# Patient Record
Sex: MALE | Race: ASIAN | NOT HISPANIC OR LATINO | URBAN - METROPOLITAN AREA
[De-identification: names, ages, dates, MRNs, and addresses within clinical notes are randomized per-mention and may not be internally consistent; named-entity substitution may affect disease eponyms.]

---

## 2017-04-02 ENCOUNTER — EMERGENCY (EMERGENCY)
Facility: HOSPITAL | Age: 32
LOS: 1 days | Discharge: OTHER INSTITUTION | End: 2017-04-02
Attending: EMERGENCY MEDICINE
Payer: MEDICAID

## 2017-04-02 VITALS
HEART RATE: 75 BPM | DIASTOLIC BLOOD PRESSURE: 80 MMHG | RESPIRATION RATE: 16 BRPM | WEIGHT: 190.04 LBS | TEMPERATURE: 99 F | SYSTOLIC BLOOD PRESSURE: 126 MMHG | HEIGHT: 74 IN | OXYGEN SATURATION: 97 %

## 2017-04-02 DIAGNOSIS — R44.0 AUDITORY HALLUCINATIONS: ICD-10-CM

## 2017-04-02 DIAGNOSIS — L05.91 PILONIDAL CYST WITHOUT ABSCESS: Chronic | ICD-10-CM

## 2017-04-02 DIAGNOSIS — I10 ESSENTIAL (PRIMARY) HYPERTENSION: ICD-10-CM

## 2017-04-02 DIAGNOSIS — R45.851 SUICIDAL IDEATIONS: ICD-10-CM

## 2017-04-02 DIAGNOSIS — F20.9 SCHIZOPHRENIA, UNSPECIFIED: ICD-10-CM

## 2017-04-02 LAB
ALBUMIN SERPL ELPH-MCNC: 4.4 G/DL — SIGNIFICANT CHANGE UP (ref 3.3–5.2)
ALP SERPL-CCNC: 58 U/L — SIGNIFICANT CHANGE UP (ref 40–120)
ALT FLD-CCNC: 19 U/L — SIGNIFICANT CHANGE UP
ANION GAP SERPL CALC-SCNC: 22 MMOL/L — HIGH (ref 5–17)
APAP SERPL-MCNC: <7.5 UG/ML — LOW (ref 10–26)
AST SERPL-CCNC: 26 U/L — SIGNIFICANT CHANGE UP
BASOPHILS # BLD AUTO: 0 K/UL — SIGNIFICANT CHANGE UP (ref 0–0.2)
BASOPHILS NFR BLD AUTO: 0 % — SIGNIFICANT CHANGE UP (ref 0–2)
BILIRUB SERPL-MCNC: 0.3 MG/DL — LOW (ref 0.4–2)
BUN SERPL-MCNC: 11 MG/DL — SIGNIFICANT CHANGE UP (ref 8–20)
CALCIUM SERPL-MCNC: 9.4 MG/DL — SIGNIFICANT CHANGE UP (ref 8.6–10.2)
CHLORIDE SERPL-SCNC: 99 MMOL/L — SIGNIFICANT CHANGE UP (ref 98–107)
CO2 SERPL-SCNC: 21 MMOL/L — LOW (ref 22–29)
CREAT SERPL-MCNC: 0.8 MG/DL — SIGNIFICANT CHANGE UP (ref 0.5–1.3)
EOSINOPHIL # BLD AUTO: 0.4 K/UL — SIGNIFICANT CHANGE UP (ref 0–0.5)
EOSINOPHIL NFR BLD AUTO: 6 % — SIGNIFICANT CHANGE UP (ref 0–6)
ETHANOL SERPL-MCNC: <10 MG/DL — SIGNIFICANT CHANGE UP
GLUCOSE SERPL-MCNC: 76 MG/DL — SIGNIFICANT CHANGE UP (ref 70–115)
HCT VFR BLD CALC: 46.6 % — SIGNIFICANT CHANGE UP (ref 42–52)
HGB BLD-MCNC: 15.9 G/DL — SIGNIFICANT CHANGE UP (ref 14–18)
LYMPHOCYTES # BLD AUTO: 37 % — SIGNIFICANT CHANGE UP (ref 20–55)
LYMPHOCYTES # BLD AUTO: 6.1 K/UL — HIGH (ref 1–4.8)
MANUAL DIF COMMENT BLD-IMP: SIGNIFICANT CHANGE UP
MCHC RBC-ENTMCNC: 28.2 PG — SIGNIFICANT CHANGE UP (ref 27–31)
MCHC RBC-ENTMCNC: 34.1 G/DL — SIGNIFICANT CHANGE UP (ref 32–36)
MCV RBC AUTO: 82.8 FL — SIGNIFICANT CHANGE UP (ref 80–94)
MONOCYTES # BLD AUTO: 1 K/UL — HIGH (ref 0–0.8)
MONOCYTES NFR BLD AUTO: 10 % — SIGNIFICANT CHANGE UP (ref 3–10)
NEUTROPHILS # BLD AUTO: 6.3 K/UL — SIGNIFICANT CHANGE UP (ref 1.8–8)
NEUTROPHILS NFR BLD AUTO: 46 % — SIGNIFICANT CHANGE UP (ref 37–73)
PLAT MORPH BLD: NORMAL — SIGNIFICANT CHANGE UP
PLATELET # BLD AUTO: 296 K/UL — SIGNIFICANT CHANGE UP (ref 150–400)
POTASSIUM SERPL-MCNC: 3.6 MMOL/L — SIGNIFICANT CHANGE UP (ref 3.5–5.3)
POTASSIUM SERPL-SCNC: 3.6 MMOL/L — SIGNIFICANT CHANGE UP (ref 3.5–5.3)
PROT SERPL-MCNC: 8.7 G/DL — SIGNIFICANT CHANGE UP (ref 6.6–8.7)
RBC # BLD: 5.63 M/UL — SIGNIFICANT CHANGE UP (ref 4.6–6.2)
RBC # FLD: 13.3 % — SIGNIFICANT CHANGE UP (ref 11–15.6)
RBC BLD AUTO: NORMAL — SIGNIFICANT CHANGE UP
SALICYLATES SERPL-MCNC: <2 MG/DL — LOW (ref 10–20)
SODIUM SERPL-SCNC: 142 MMOL/L — SIGNIFICANT CHANGE UP (ref 135–145)
VARIANT LYMPHS # BLD: 1 % — SIGNIFICANT CHANGE UP (ref 0–6)
WBC # BLD: 13.8 K/UL — HIGH (ref 4.8–10.8)
WBC # FLD AUTO: 13.8 K/UL — HIGH (ref 4.8–10.8)

## 2017-04-02 PROCEDURE — 99285 EMERGENCY DEPT VISIT HI MDM: CPT

## 2017-04-02 RX ORDER — HALOPERIDOL DECANOATE 100 MG/ML
5 INJECTION INTRAMUSCULAR ONCE
Qty: 0 | Refills: 0 | Status: COMPLETED | OUTPATIENT
Start: 2017-04-02 | End: 2017-04-02

## 2017-04-02 RX ADMIN — Medication 2 MILLIGRAM(S): at 23:05

## 2017-04-02 RX ADMIN — HALOPERIDOL DECANOATE 5 MILLIGRAM(S): 100 INJECTION INTRAMUSCULAR at 23:05

## 2017-04-02 NOTE — ED PROVIDER NOTE - NS ED MD SCRIBE ATTENDING SCRIBE SECTIONS
PHYSICAL EXAM/INTAKE ASSESSMENT/SCREENINGS/REVIEW OF SYSTEMS/PAST MEDICAL/SURGICAL/SOCIAL HISTORY/HIV/VITAL SIGNS( Pullset)/HISTORY OF PRESENT ILLNESS/DISPOSITION

## 2017-04-02 NOTE — ED PROVIDER NOTE - OBJECTIVE STATEMENT
33 y/o M presents to ED BIBA with [employer] c/o auditory hallucinations and depression. Pt has hx of schizophrenia and complains of hearing voices telling him that he is not worth it and should kill himself. He denies attempt. Pt has hx of HTN and is compliant with meds. Pt states he used to see psychiatrist in past. No further complaints at this time.

## 2017-04-02 NOTE — ED ADULT TRIAGE NOTE - CHIEF COMPLAINT QUOTE
pt with hx schizophrenia biba from home with report that he has been having suicidal thoughts.  pt ran out of his psych medications two days ago.  denies drug or alcohol use today.  pt has no obvious injuries. +flat affect   pt evaluated by Dr Wen and cleared for .

## 2017-04-03 VITALS
RESPIRATION RATE: 20 BRPM | HEART RATE: 71 BPM | OXYGEN SATURATION: 99 % | SYSTOLIC BLOOD PRESSURE: 134 MMHG | TEMPERATURE: 98 F | DIASTOLIC BLOOD PRESSURE: 75 MMHG

## 2017-04-03 DIAGNOSIS — F25.9 SCHIZOAFFECTIVE DISORDER, UNSPECIFIED: ICD-10-CM

## 2017-04-03 DIAGNOSIS — R69 ILLNESS, UNSPECIFIED: ICD-10-CM

## 2017-04-03 LAB
AMPHET UR-MCNC: NEGATIVE — SIGNIFICANT CHANGE UP
APPEARANCE UR: CLEAR — SIGNIFICANT CHANGE UP
BARBITURATES UR SCN-MCNC: NEGATIVE — SIGNIFICANT CHANGE UP
BENZODIAZ UR-MCNC: NEGATIVE — SIGNIFICANT CHANGE UP
BILIRUB UR-MCNC: NEGATIVE — SIGNIFICANT CHANGE UP
COCAINE METAB.OTHER UR-MCNC: NEGATIVE — SIGNIFICANT CHANGE UP
COLOR SPEC: YELLOW — SIGNIFICANT CHANGE UP
DIFF PNL FLD: ABNORMAL
GLUCOSE UR QL: NEGATIVE MG/DL — SIGNIFICANT CHANGE UP
KETONES UR-MCNC: NEGATIVE — SIGNIFICANT CHANGE UP
LEUKOCYTE ESTERASE UR-ACNC: NEGATIVE — SIGNIFICANT CHANGE UP
METHADONE UR-MCNC: NEGATIVE — SIGNIFICANT CHANGE UP
NITRITE UR-MCNC: NEGATIVE — SIGNIFICANT CHANGE UP
OPIATES UR-MCNC: NEGATIVE — SIGNIFICANT CHANGE UP
PCP SPEC-MCNC: SIGNIFICANT CHANGE UP
PCP UR-MCNC: NEGATIVE — SIGNIFICANT CHANGE UP
PH UR: 6 — SIGNIFICANT CHANGE UP (ref 4.8–8)
PROT UR-MCNC: NEGATIVE MG/DL — SIGNIFICANT CHANGE UP
SP GR SPEC: 1.02 — SIGNIFICANT CHANGE UP (ref 1.01–1.02)
THC UR QL: NEGATIVE — SIGNIFICANT CHANGE UP
UROBILINOGEN FLD QL: NEGATIVE MG/DL — SIGNIFICANT CHANGE UP
WBC UR QL: SIGNIFICANT CHANGE UP

## 2017-04-03 PROCEDURE — 80053 COMPREHEN METABOLIC PANEL: CPT

## 2017-04-03 PROCEDURE — 96372 THER/PROPH/DIAG INJ SC/IM: CPT | Mod: XU

## 2017-04-03 PROCEDURE — 85027 COMPLETE CBC AUTOMATED: CPT

## 2017-04-03 PROCEDURE — 93010 ELECTROCARDIOGRAM REPORT: CPT

## 2017-04-03 PROCEDURE — 99285 EMERGENCY DEPT VISIT HI MDM: CPT | Mod: 25

## 2017-04-03 PROCEDURE — 93005 ELECTROCARDIOGRAM TRACING: CPT

## 2017-04-03 PROCEDURE — 81001 URINALYSIS AUTO W/SCOPE: CPT

## 2017-04-03 PROCEDURE — 96374 THER/PROPH/DIAG INJ IV PUSH: CPT

## 2017-04-03 PROCEDURE — 80307 DRUG TEST PRSMV CHEM ANLYZR: CPT

## 2017-04-03 PROCEDURE — 99285 EMERGENCY DEPT VISIT HI MDM: CPT

## 2017-04-03 RX ORDER — DIPHENHYDRAMINE HCL 50 MG
50 CAPSULE ORAL EVERY 6 HOURS
Qty: 0 | Refills: 0 | Status: DISCONTINUED | OUTPATIENT
Start: 2017-04-03 | End: 2017-04-06

## 2017-04-03 RX ORDER — NICOTINE POLACRILEX 2 MG
4 GUM BUCCAL
Qty: 0 | Refills: 0 | Status: DISCONTINUED | OUTPATIENT
Start: 2017-04-03 | End: 2017-04-06

## 2017-04-03 RX ORDER — HALOPERIDOL DECANOATE 100 MG/ML
5 INJECTION INTRAMUSCULAR EVERY 6 HOURS
Qty: 0 | Refills: 0 | Status: DISCONTINUED | OUTPATIENT
Start: 2017-04-03 | End: 2017-04-06

## 2017-04-03 RX ORDER — LOSARTAN POTASSIUM 100 MG/1
100 TABLET, FILM COATED ORAL DAILY
Qty: 0 | Refills: 0 | Status: DISCONTINUED | OUTPATIENT
Start: 2017-04-03 | End: 2017-04-06

## 2017-04-03 RX ORDER — HALOPERIDOL DECANOATE 100 MG/ML
5 INJECTION INTRAMUSCULAR
Qty: 0 | Refills: 0 | Status: DISCONTINUED | OUTPATIENT
Start: 2017-04-03 | End: 2017-04-06

## 2017-04-03 RX ADMIN — HALOPERIDOL DECANOATE 5 MILLIGRAM(S): 100 INJECTION INTRAMUSCULAR at 09:32

## 2017-04-03 RX ADMIN — Medication 4 MILLIGRAM(S): at 10:07

## 2017-04-03 RX ADMIN — LOSARTAN POTASSIUM 100 MILLIGRAM(S): 100 TABLET, FILM COATED ORAL at 09:32

## 2017-04-03 NOTE — ED BEHAVIORAL HEALTH ASSESSMENT NOTE - RISK ASSESSMENT
high - multiple stressors including family/financial, with 2 medications that have not been used x2 days; mood episode with command hallucinations to hurt himself with access to knives at his home. He does have supportive family and is engaged in school.
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
## 2017-04-03 NOTE — ED BEHAVIORAL HEALTH ASSESSMENT NOTE - CURRENT MEDICATION
haldol 5 mg TID; ativan 1 mg BID; Klonopin 1 mg QD HS; Celexa 20 mg QD HS; Trazodone 100 mg HS; Adderall unknown amount; Hyzaar 100-12.5mg

## 2017-04-03 NOTE — ED BEHAVIORAL HEALTH ASSESSMENT NOTE - HPI (INCLUDE ILLNESS QUALITY, SEVERITY, DURATION, TIMING, CONTEXT, MODIFYING FACTORS, ASSOCIATED SIGNS AND SYMPTOMS)
33 yo domiciled,  male with PMH ADHD and schizophrenia BIBA, accompanied by police for SI. States he has been hearing a female voice, which he sometimes thinks is his consciousness, telling him to kill himself "because she has better plans for me". Has been hearing these voices for a few days and has heard them before in the past which resulted in psychiatric hospitalization at [facility] in March 2013. States he was seeing a therapist in [place], [facility], but she is sick and unable to see him so he ran out of his Adderall and Celexa 2 days ago.  Denies HI, previous SA, VH/TH.  Wife states that the patient stated he was having SI, command hallucinations, agitation and depression x 3 days. Patient told wife "you poisoned me, you gave me the poison". She states patient called an inpatient psychiatric facility, which instructed him to call 911. He took a knife out of the kitchen and threatened to kill himself, then wife got knife away from him and called 911.
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
## 2017-04-03 NOTE — ED BEHAVIORAL HEALTH ASSESSMENT NOTE - DETAILS
7 yo and 5 months old command hallucinations to kill himself with threat to cut himself with knife yesterday maternal grandmother unknown illness female voice that tells him to kill himself because "she has better plans for me" admissions wife

## 2017-04-03 NOTE — ED ADULT NURSE REASSESSMENT NOTE - NS ED NURSE REASSESS COMMENT FT1
report given to  MARCELA Cope.  pt escorted to  unit with security.  charge RN Conrad aware of pt's chief complaint.
Visible on unit, calm and in control, called wife on phone, offers no complaints of pain.
as per telepsych too many people on list need to be seen in morning
Pt currently cooperative, in bed IV lock removed as per  policy . EKG done, pending urine sample.  Pt states he was hearing auditory Hallucinations to hurt self no HI, no actual plan at this time.  He says he ran out of his meds a few days ago and has been feeling different. Pt currently drowsy and not able to answer too many questions at this time.  Will continue to monitor and maintain safety.  Pt currently not aggressive/ assaultive at this present time.

## 2017-04-03 NOTE — ED ADULT NURSE REASSESSMENT NOTE - GENERAL PATIENT STATE
comfortable appearance/resting/sleeping
comfortable appearance/resting/sleeping/cooperative
resting/sleeping/comfortable appearance
comfortable appearance/drowsy/resting/sleeping

## 2017-04-03 NOTE — ED BEHAVIORAL HEALTH NOTE - BEHAVIORAL HEALTH NOTE
SW Note: Pt will be transferred to [facility] for inpt psychiatric tx. Pt aware of plan. Dr. Johnson notified family. 9.37 legals completed. Pt has medicare and medicaid, no auth needed. Ambulance arranged with [facility].
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
## 2018-10-27 ENCOUNTER — EMERGENCY (EMERGENCY)
Facility: HOSPITAL | Age: 33
LOS: 0 days | Discharge: HOME | End: 2018-10-28
Attending: FAMILY MEDICINE | Admitting: EMERGENCY MEDICINE
Payer: SELF-PAY

## 2018-10-27 VITALS — WEIGHT: 225.09 LBS | HEIGHT: 73 IN
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
## 2018-10-27 DIAGNOSIS — S13.9XXA SPRAIN OF JOINTS AND LIGAMENTS OF UNSPECIFIED PARTS OF NECK, INITIAL ENCOUNTER: ICD-10-CM
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
## 2018-10-27 DIAGNOSIS — M54.2 CERVICALGIA: ICD-10-CM

## 2018-10-27 DIAGNOSIS — L05.91 PILONIDAL CYST WITHOUT ABSCESS: Chronic | ICD-10-CM

## 2018-10-27 DIAGNOSIS — F17.200 NICOTINE DEPENDENCE, UNSPECIFIED, UNCOMPLICATED: ICD-10-CM

## 2018-10-27 PROCEDURE — 99285 EMERGENCY DEPT VISIT HI MDM: CPT | Mod: 25

## 2018-10-27 RX ORDER — ACETAMINOPHEN 500 MG
1000 TABLET ORAL ONCE
Qty: 0 | Refills: 0 | Status: COMPLETED | OUTPATIENT
Start: 2018-10-27 | End: 2018-10-27

## 2018-10-27 RX ADMIN — Medication 1000 MILLIGRAM(S): at 23:58

## 2018-10-27 NOTE — ED PROVIDER NOTE - OBJECTIVE STATEMENT
pt iis a 34 yo wm with hx schizoaffective disorder and htn who was at work at [employer] in [employer] [place] who was sitting in back room in chair. Pt states leaned backwards in roni and fell back, hitting neck. No loc but was dazed for 5 sec. pt states since then having spasmsin neck and left side. Also noted bruising to right elbow. Pt is smoker nondrinker no drugs. took four asa withut relief. Pt allergic to ibuprofen.

## 2018-10-27 NOTE — ED ADULT NURSE NOTE - NSIMPLEMENTINTERV_GEN_ALL_ED
Implemented All Universal Safety Interventions:  [place] to call system. Call bell, personal items and telephone within reach. Instruct patient to call for assistance. Room bathroom lighting operational. Non-slip footwear when patient is off stretcher. Physically safe environment: no spills, clutter or unnecessary equipment. Stretcher in lowest position, wheels locked, appropriate side rails in place.

## 2018-10-27 NOTE — ED ADULT NURSE NOTE - OBJECTIVE STATEMENT
33 year old male with a past medical history of schizoaffective disorder and hypertension  presenting to the ED via walk-in triage for muscle pain. Patient reports that symptoms are 5/10, aching, and to his left arm, right ribs, and bilateral posterior scapula.   Patient states that he was at work and he "fell out of a chair," landing on his left side. Patient states that after the fall his left shoulder, right posterior ribs/back, and bilateral scapular area are sore/aching. Patient reports pain is worse with ROM, exertion, and movement.   Negative: Syncope/LOC, fevers, chills, headache, dizziness, weakness, lethargy, vision changes, hearing changes, focal/lateralizing neurologic deficits, throat pain, chest pain, palpitations, shortness of breath, wheezing, abdominal pain, nausea, vomiting, constipation, diarrhea, urinary signs/symptoms, or edema.   Upon physical examination patient is A+Ox4/GCS 15 and conversive. PERRLA. S1S2 heard. Lung sounds clear. Abdomen soft/non-tender. FROM/CMS throughout all extremities.

## 2018-10-27 NOTE — ED PROVIDER NOTE - PHYSICAL EXAMINATION
alert nad mild erythema left frontal area non tender.  mild c spine tenderness. Mild right medial mal tenderness.

## 2018-10-28 VITALS
SYSTOLIC BLOOD PRESSURE: 140 MMHG | OXYGEN SATURATION: 99 % | DIASTOLIC BLOOD PRESSURE: 87 MMHG | HEART RATE: 87 BPM | RESPIRATION RATE: 17 BRPM

## 2018-10-28 PROCEDURE — 70450 CT HEAD/BRAIN W/O DYE: CPT | Mod: 26

## 2018-10-28 PROCEDURE — 73610 X-RAY EXAM OF ANKLE: CPT | Mod: 26,LT

## 2018-10-28 PROCEDURE — 72125 CT NECK SPINE W/O DYE: CPT | Mod: 26

## 2019-03-01 ENCOUNTER — OUTPATIENT (OUTPATIENT)
Dept: OUTPATIENT SERVICES | Facility: HOSPITAL | Age: 34
LOS: 1 days | End: 2019-03-01
Payer: MEDICARE

## 2019-03-01 DIAGNOSIS — L05.91 PILONIDAL CYST WITHOUT ABSCESS: Chronic | ICD-10-CM

## 2019-03-14 ENCOUNTER — INPATIENT (INPATIENT)
Facility: HOSPITAL | Age: 34
LOS: 4 days | Discharge: HOME | End: 2019-03-19
Attending: PSYCHIATRY & NEUROLOGY | Admitting: PSYCHIATRY & NEUROLOGY
Payer: MEDICAID

## 2019-03-14 VITALS
DIASTOLIC BLOOD PRESSURE: 98 MMHG | HEART RATE: 111 BPM | SYSTOLIC BLOOD PRESSURE: 149 MMHG | RESPIRATION RATE: 18 BRPM | TEMPERATURE: 99 F | OXYGEN SATURATION: 100 %

## 2019-03-14 DIAGNOSIS — L05.91 PILONIDAL CYST WITHOUT ABSCESS: Chronic | ICD-10-CM

## 2019-03-14 LAB
ALBUMIN SERPL ELPH-MCNC: 4 G/DL — SIGNIFICANT CHANGE UP (ref 3.3–5)
ALP SERPL-CCNC: 71 U/L — SIGNIFICANT CHANGE UP (ref 40–120)
ALT FLD-CCNC: 44 U/L — SIGNIFICANT CHANGE UP (ref 12–78)
AMPHET UR-MCNC: POSITIVE — SIGNIFICANT CHANGE UP
ANION GAP SERPL CALC-SCNC: 8 MMOL/L — SIGNIFICANT CHANGE UP (ref 5–17)
APAP SERPL-MCNC: < 2 UG/ML (ref 10–30)
AST SERPL-CCNC: 33 U/L — SIGNIFICANT CHANGE UP (ref 15–37)
BARBITURATES UR SCN-MCNC: NEGATIVE — SIGNIFICANT CHANGE UP
BASOPHILS # BLD AUTO: 0.05 K/UL — SIGNIFICANT CHANGE UP (ref 0–0.2)
BASOPHILS NFR BLD AUTO: 0.5 % — SIGNIFICANT CHANGE UP (ref 0–2)
BENZODIAZ UR-MCNC: NEGATIVE — SIGNIFICANT CHANGE UP
BILIRUB SERPL-MCNC: 0.4 MG/DL — SIGNIFICANT CHANGE UP (ref 0.2–1.2)
BUN SERPL-MCNC: 11 MG/DL — SIGNIFICANT CHANGE UP (ref 7–23)
CALCIUM SERPL-MCNC: 9 MG/DL — SIGNIFICANT CHANGE UP (ref 8.5–10.1)
CHLORIDE SERPL-SCNC: 100 MMOL/L — SIGNIFICANT CHANGE UP (ref 96–108)
CO2 SERPL-SCNC: 26 MMOL/L — SIGNIFICANT CHANGE UP (ref 22–31)
COCAINE METAB.OTHER UR-MCNC: NEGATIVE — SIGNIFICANT CHANGE UP
CREAT SERPL-MCNC: 0.84 MG/DL — SIGNIFICANT CHANGE UP (ref 0.5–1.3)
EOSINOPHIL # BLD AUTO: 0.15 K/UL — SIGNIFICANT CHANGE UP (ref 0–0.5)
EOSINOPHIL NFR BLD AUTO: 1.4 % — SIGNIFICANT CHANGE UP (ref 0–6)
ETHANOL SERPL-MCNC: <10 MG/DL — SIGNIFICANT CHANGE UP (ref 0–10)
GLUCOSE SERPL-MCNC: 84 MG/DL — SIGNIFICANT CHANGE UP (ref 70–99)
HCT VFR BLD CALC: 46.1 % — SIGNIFICANT CHANGE UP (ref 39–50)
HGB BLD-MCNC: 15.6 G/DL — SIGNIFICANT CHANGE UP (ref 13–17)
IMM GRANULOCYTES NFR BLD AUTO: 0.2 % — SIGNIFICANT CHANGE UP (ref 0–1.5)
LYMPHOCYTES # BLD AUTO: 2.91 K/UL — SIGNIFICANT CHANGE UP (ref 1–3.3)
LYMPHOCYTES # BLD AUTO: 27.3 % — SIGNIFICANT CHANGE UP (ref 13–44)
MCHC RBC-ENTMCNC: 27.8 PG — SIGNIFICANT CHANGE UP (ref 27–34)
MCHC RBC-ENTMCNC: 33.8 GM/DL — SIGNIFICANT CHANGE UP (ref 32–36)
MCV RBC AUTO: 82 FL — SIGNIFICANT CHANGE UP (ref 80–100)
METHADONE UR-MCNC: NEGATIVE — SIGNIFICANT CHANGE UP
MONOCYTES # BLD AUTO: 1.27 K/UL — HIGH (ref 0–0.9)
MONOCYTES NFR BLD AUTO: 11.9 % — SIGNIFICANT CHANGE UP (ref 2–14)
NEUTROPHILS # BLD AUTO: 6.25 K/UL — SIGNIFICANT CHANGE UP (ref 1.8–7.4)
NEUTROPHILS NFR BLD AUTO: 58.7 % — SIGNIFICANT CHANGE UP (ref 43–77)
NRBC # BLD: 0 /100 WBCS — SIGNIFICANT CHANGE UP (ref 0–0)
OPIATES UR-MCNC: NEGATIVE — SIGNIFICANT CHANGE UP
PCP SPEC-MCNC: SIGNIFICANT CHANGE UP
PCP UR-MCNC: NEGATIVE — SIGNIFICANT CHANGE UP
PLATELET # BLD AUTO: 281 K/UL — SIGNIFICANT CHANGE UP (ref 150–400)
POTASSIUM SERPL-MCNC: 3.6 MMOL/L — SIGNIFICANT CHANGE UP (ref 3.5–5.3)
POTASSIUM SERPL-SCNC: 3.6 MMOL/L — SIGNIFICANT CHANGE UP (ref 3.5–5.3)
PROT SERPL-MCNC: 8.8 GM/DL — HIGH (ref 6–8.3)
RBC # BLD: 5.62 M/UL — SIGNIFICANT CHANGE UP (ref 4.2–5.8)
RBC # FLD: 13.2 % — SIGNIFICANT CHANGE UP (ref 10.3–14.5)
SALICYLATES SERPL-MCNC: 4.2 MG/DL — SIGNIFICANT CHANGE UP (ref 2.8–20)
SODIUM SERPL-SCNC: 134 MMOL/L — LOW (ref 135–145)
THC UR QL: NEGATIVE — SIGNIFICANT CHANGE UP
WBC # BLD: 10.65 K/UL — HIGH (ref 3.8–10.5)
WBC # FLD AUTO: 10.65 K/UL — HIGH (ref 3.8–10.5)

## 2019-03-14 PROCEDURE — 99285 EMERGENCY DEPT VISIT HI MDM: CPT | Mod: 25

## 2019-03-14 PROCEDURE — G0427: CPT | Mod: GT

## 2019-03-14 RX ADMIN — Medication 0.5 MILLIGRAM(S): at 18:54

## 2019-03-14 NOTE — ED BEHAVIORAL HEALTH ASSESSMENT NOTE - OTHER PAST PSYCHIATRIC HISTORY (INCLUDE DETAILS REGARDING ONSET, COURSE OF ILLNESS, INPATIENT/OUTPATIENT TREATMENT)
as in HPI, reports he has been trying to change psychiatrist for the past year but has been unsuccessful

## 2019-03-14 NOTE — ED STATDOCS - NS_ ATTENDINGSCRIBEDETAILS _ED_A_ED_FT
I Khoi Chandler MD saw and examined the patient. Scribe documented for me and under my supervision. I have modified the scribe's documentation where necessary to reflect my history, physical exam and other relevant documentations pertinent to the care of the patient.
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
## 2019-03-14 NOTE — ED BEHAVIORAL HEALTH ASSESSMENT NOTE - HPI (INCLUDE ILLNESS QUALITY, SEVERITY, DURATION, TIMING, CONTEXT, MODIFYING FACTORS, ASSOCIATED SIGNS AND SYMPTOMS)
34year old domiciled disabled adult Islamic male history of one past psychiatric admission at [facility] (4/3-11/17) history of outpatient treatment at [facility] in [place], medical history of hypertension and ulcerative colitis, no known trauma, legal or substance use history, presents brought to the ED by his wife with report of SI since yesterday and carrying a switchblade in his jacket. He was seen via telepsychiatry in a private room with a 1:1 present. Patient had some difficulty giving a clear explanation of the events leading to his presentation in the ED. His version of the story is as follows: "when I'm having symptoms it hard for me and hard for others to realize" and referenced a conversation they were having last night, then asked for her to be in the room. He accepted the explanation that this part of the evaluation is fully confidential. He went on to explain that a few days ago he put a switchblade in his jacket because he "thought he needed it' to "protect myself' because he was feeling 'paranoia' because his landlord's son is 'out of control using drugs and has escalated from using in his room to selling, and he is concered that this potentially puts he and his family at risk due to people he doesn't know coming to his property (as he and his landlord live at the same address). In regards to the switchblade, reports his 'attitude has changed' and his wife threw it out and he agrees that it was 'causing him trouble.' When he is asked directly if he has any thoughts or intent to harm himself, he is unable to answer directly, but admits that last night his wife caught him calling a suicide hotline. he is unable to answer direct questions about mood symptoms or questions, but admits to feeling 'stressed' and to waking up early and not getting enough sleep.     Collateral: Collateral provided by wife, Jackeline Cade [phone], daily contact, reliability is high. Per wife the PHI, starts 3 days ago and progressed into this morning. 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  Since November patient has been seemingly depressed, in the context of isolating in the house and reporting that he feels hopeless because he is not working and at home on disability. For the past week, patient has not been sleeping, and believes the reason is because he is taking too many medications. Wife reports patient is prescribed Trazadone, Adderall (dosage and frequencies) amongst other medications, by Dr. Alejandro/MARTHA Stafford [phone]. Yesterday, he reported to his wife that 3 days ago he was having thoughts of hurting himself and believes people “are out to get him”. Early this morning, around 5 am, she noticed that patient wasn’t home. When she called him, patient reported that he was at the beach with his sleeping bag, because he couldn’t sleep and was waking up in cold sweats. She reports he drove to the beach an hour away from his home, rather than to the beach that was about 15 minutes away from his home. She asked him to drive back home. Upon arrival, patient was still seemingly paranoid, in the context of still believing people are out to get him.     At baseline, wife describes patient as a mildly sad, but loving caring father. He was hospitalized at [facility] in April 2017, due suicidal ideations and symptoms of psychosis in the context of hearing voices telling him to kill himself. He was discharged with the recommendation to follow up at [facility]. He has been compliant with treatment. He has no trauma, abuse,  or legal history. He has no history of ETOH or illicit drug use. Wife reports he has no access to weapons, guns or knives; however, per the ED report, patient has been walking around with a small switch blade in his pocket. Wife has significant safety concerns but is amendable to a discharge home. 34year old domiciled disabled adult Islamic male history of one past psychiatric admission at [facility] (4/3-11/17) history of outpatient treatment at [facility] in [place], medical history of hypertension and ulcerative colitis, no known trauma, legal or substance use history, presents brought to the ED by his wife with report of SI since yesterday and carrying a switchblade in his jacket. He was seen via telepsychiatry in a private room with a 1:1 present. Patient had some difficulty giving a clear explanation of the events leading to his presentation in the ED. His version of the story is as follows: "when I'm having symptoms it hard for me and hard for others to realize" and referenced a conversation they were having last night, then asked for her to be in the room. He accepted the explanation that this part of the evaluation is fully confidential. He went on to explain that a few days ago he put a switchblade in his jacket because he "thought he needed it' to "protect myself' because he was feeling 'paranoia' because his landlord's son is 'out of control using drugs and has escalated from using in his room to selling, and he is concerned that this potentially puts he and his family at risk due to people he doesn't know coming to his property (as he and his landlord live at the same address). In regards to the switchblade, reports his 'attitude has changed' and his wife threw it out and he agrees that it was 'causing him trouble.' When he is asked directly if he has any thoughts or intent to harm himself, he is unable to answer directly, but admits that last night his wife caught him calling a suicide hotline. he is unable to answer direct questions about mood symptoms or questions, but admits to feeling 'stressed' and to waking up early and not getting enough sleep.     Collateral: Collateral provided by wife, Jackeline Cade [phone], daily contact, reliability is high. Per wife the PHI, starts 3 days ago and progressed into this morning. 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  Since November patient has been seemingly depressed, in the context of isolating in the house and reporting that he feels hopeless because he is not working and at home on disability. For the past week, patient has not been sleeping, and believes the reason is because he is taking too many medications. Wife reports patient is prescribed Trazadone, Adderall (dosage and frequencies) amongst other medications, by Dr. Alejandro/MARTHA Stafford [phone]. Yesterday, he reported to his wife that 3 days ago he was having thoughts of hurting himself and believes people “are out to get him”. Early this morning, around 5 am, she noticed that patient wasn’t home. When she called him, patient reported that he was at the beach with his sleeping bag, because he couldn’t sleep and was waking up in cold sweats. She reports he drove to the beach an hour away from his home, rather than to the beach that was about 15 minutes away from his home. She asked him to drive back home. Upon arrival, patient was still seemingly paranoid, in the context of still believing people are out to get him.     At baseline, wife describes patient as a mildly sad, but loving caring father. He was hospitalized at [facility] in April 2017, due suicidal ideations and symptoms of psychosis in the context of hearing voices telling him to kill himself. He was discharged with the recommendation to follow up at [facility]. He has been compliant with treatment. He has no trauma, abuse,  or legal history. He has no history of ETOH or illicit drug use. Wife reports he has no access to weapons, guns or knives; however, per the ED report, patient has been walking around with a small switch blade in his pocket. Wife has significant safety concerns but is amendable to a discharge home.

## 2019-03-14 NOTE — ED ADULT TRIAGE NOTE - CHIEF COMPLAINT QUOTE
Patient presents with friend who reports patient made suicidal statements earlier today and is seeking help

## 2019-03-14 NOTE — ED BEHAVIORAL HEALTH ASSESSMENT NOTE - SUMMARY
34year old domiciled disabled adult Islamic male history of one past psychiatric admission at [facility] (4/3-11/17) history of outpatient treatment at [facility] in [place], medical history of hypertension and ulcerative colitis, no known trauma, legal or substance use history, presents brought to the ED by his wife with report of SI since yesterday and carrying a switchblade in his jacket. He was an impoverished and incomplete historian, but admitted to leaving the house to drive to the beach early this morning, and carrying a switchblade for the past two days as he was feeling paranoid. He admits that an increase in haldol may help with these symptoms, but refuses to accept that hospital admission may be helpful. His wife reported that he described suicidal thoughts in the context of these experiences. He will be involuntarily admitted.     Patient had some difficulty giving a clear explanation of the events leading to his presentation in the ED. His version of the story is as follows: "when I'm having symptoms it hard for me and hard for others to realize" and referenced a conversation they were having last night, then asked for her to be in the room. He accepted the explanation that this part of the evaluation is fully confidential. He went on to explain that a few days ago he put a switchblade in his jacket because he "thought he needed it' to "protect myself' because he was feeling 'paranoia' because his landlord's son is 'out of control using drugs and has escalated from using in his room to selling, and he is concerned that this potentially puts he and his family at risk due to people he doesn't know coming to his property (as he and his landlord live at the same address). In regards to the switchblade, reports his 'attitude has changed' and his wife threw it out and he agrees that it was 'causing him trouble.' When he is asked directly if he has any thoughts or intent to harm himself, he is unable to answer directly, but admits that last night his wife caught him calling a suicide hotline. he is unable to answer direct questions about mood symptoms or questions, but admits to feeling 'stressed' and to waking up early and not getting enough sleep.

## 2019-03-14 NOTE — ED ADULT NURSE REASSESSMENT NOTE - NS ED NURSE REASSESS COMMENT FT1
patient received from BRANDEN Choi RN. CO continued for safety. Safety maintained, will continue to monitor.

## 2019-03-14 NOTE — ED STATDOCS - PROGRESS NOTE DETAILS
Viridiana RONDON for Dr. Chandler: 35 y/o male with a PMHx of Schizophrenia presents to the ED c/o SI thoughts since yesterday. Pt reports he has been dealing with suicidal thoughts for a while and has been seeing a doctor. Pt found a switch blade 2 days ago and has been carrying it in his jacket. States last night he felt like using on himself prompting visit to ED. Pt states that he is not sure if he has been taking the right medications, has been over medication or under medicated. States he does not like his current doctor because they refer to him as a "client". Viridiana RONDON for Dr. Chandler: 33 y/o male with a PMHx of Schizophrenia presents to the ED c/o SI thoughts since yesterday. Pt reports he has been dealing with suicidal thoughts for a while and has been seeing a doctor. Pt found a switch blade 2 days ago and has been carrying it in his jacket. States last night he felt like using on himself prompting visit to ED. In ED, +anxiety. Pt states that he is not sure if he has been taking the right medications, has been over medication or under medicated. States he does not like his current doctor because they refer to him as a "client".

## 2019-03-14 NOTE — ED ADULT NURSE NOTE - OBJECTIVE STATEMENT
33 y/o M presents to the ED for SI, hopelessness. Pt denies HI. Pt states he has been feeling hopeless since losing his job in November and feels family strain due to having two children including a special needs child.

## 2019-03-14 NOTE — ED PROVIDER NOTE - OBJECTIVE STATEMENT
35 y/o male with a PMHx of Schizoaffective disorder, HTN, ulcerative colitis presents to the ED c/o SI thoughts since yesterday. Pt reports he has been dealing with suicidal thoughts for a while and has been seeing a doctor. Pt states that since 10/2018 he has not been feeling himself, feeling depressed, hopeless, and difficulty making decisions. Pt found a switch blade 2 days ago and has been carrying it in his jacket.  States last night he felt like using on himself prompting visit to ED. Pt states that he is not sure if he has been taking the right medications, feels he has been over medication or under medicated. States that since starting Lamictal he has noticed a change in his mood. States he does not like his current doctor because they refer to him as a "client".  Dr. Meadows at [facility].  Pt has been trying to find a new psychiatrist, but has been unable to. On Lamictal, trazodone, Adderall, haldol.

## 2019-03-14 NOTE — ED BEHAVIORAL HEALTH ASSESSMENT NOTE - DESCRIPTION
ulcerative colitis lives with wife and children Consult called in at 20:03. Patient in the ED for 2 hours and 28 minutes prior to telepsych consult. Per nurse, Lety, patient arrived at 17:46, dressed appropriately for the weather is clean, neat and accompanied by his wife and 2 children. Per nurse, upon arrival patient reports he was terminated form his job in November (2018), is feeling hopeless and has no motivation to be productive. He endorsed SI with no plan or intent. He does appear depressed, helpless and hopeless with a sad affect. He cooperated with routine blood work and provided a urine specimen. He cooperated with safety protocols and allowed security to wand him and secure his belongings. He changed into a hospital gown without force or encouragement from staff. He is engaging with staff appropriately and in behavioral control. He has not eaten since his arrival. He denies delusions and hallucinations. His speech is clear with a linear thought content. He is able to convey and have his needs met. No additional prn medications, security or restraints were required during his ED visit.

## 2019-03-15 DIAGNOSIS — F20.3 UNDIFFERENTIATED SCHIZOPHRENIA: ICD-10-CM

## 2019-03-15 LAB
ALBUMIN SERPL ELPH-MCNC: 3.5 G/DL — SIGNIFICANT CHANGE UP (ref 3.3–5)
ALP SERPL-CCNC: 64 U/L — SIGNIFICANT CHANGE UP (ref 40–120)
ALT FLD-CCNC: 39 U/L — SIGNIFICANT CHANGE UP (ref 12–78)
ANION GAP SERPL CALC-SCNC: 8 MMOL/L — SIGNIFICANT CHANGE UP (ref 5–17)
AST SERPL-CCNC: 23 U/L — SIGNIFICANT CHANGE UP (ref 15–37)
BILIRUB DIRECT SERPL-MCNC: <0.1 MG/DL — SIGNIFICANT CHANGE UP (ref 0–0.2)
BILIRUB INDIRECT FLD-MCNC: >0.2 MG/DL — SIGNIFICANT CHANGE UP (ref 0.2–1)
BILIRUB SERPL-MCNC: 0.3 MG/DL — SIGNIFICANT CHANGE UP (ref 0.2–1.2)
BUN SERPL-MCNC: 14 MG/DL — SIGNIFICANT CHANGE UP (ref 7–23)
CALCIUM SERPL-MCNC: 8.8 MG/DL — SIGNIFICANT CHANGE UP (ref 8.5–10.1)
CHLORIDE SERPL-SCNC: 110 MMOL/L — HIGH (ref 96–108)
CHOLEST SERPL-MCNC: 159 MG/DL — SIGNIFICANT CHANGE UP (ref 10–199)
CO2 SERPL-SCNC: 24 MMOL/L — SIGNIFICANT CHANGE UP (ref 22–31)
CREAT SERPL-MCNC: 0.71 MG/DL — SIGNIFICANT CHANGE UP (ref 0.5–1.3)
GLUCOSE SERPL-MCNC: 101 MG/DL — HIGH (ref 70–99)
HDLC SERPL-MCNC: 25 MG/DL — LOW
LIPID PNL WITH DIRECT LDL SERPL: 93 MG/DL — SIGNIFICANT CHANGE UP
MAGNESIUM SERPL-MCNC: 2.4 MG/DL — SIGNIFICANT CHANGE UP (ref 1.6–2.6)
PHOSPHATE SERPL-MCNC: 4.5 MG/DL — SIGNIFICANT CHANGE UP (ref 2.5–4.5)
POTASSIUM SERPL-MCNC: 3.9 MMOL/L — SIGNIFICANT CHANGE UP (ref 3.5–5.3)
POTASSIUM SERPL-SCNC: 3.9 MMOL/L — SIGNIFICANT CHANGE UP (ref 3.5–5.3)
PROT SERPL-MCNC: 7.8 GM/DL — SIGNIFICANT CHANGE UP (ref 6–8.3)
SODIUM SERPL-SCNC: 142 MMOL/L — SIGNIFICANT CHANGE UP (ref 135–145)
TOTAL CHOLESTEROL/HDL RATIO MEASUREMENT: 6.4 RATIO — SIGNIFICANT CHANGE UP (ref 3.4–9.6)
TRIGL SERPL-MCNC: 206 MG/DL — HIGH (ref 10–149)

## 2019-03-15 PROCEDURE — 93010 ELECTROCARDIOGRAM REPORT: CPT

## 2019-03-15 PROCEDURE — 99232 SBSQ HOSP IP/OBS MODERATE 35: CPT

## 2019-03-15 RX ORDER — DOCUSATE SODIUM 100 MG
100 CAPSULE ORAL THREE TIMES A DAY
Qty: 0 | Refills: 0 | Status: DISCONTINUED | OUTPATIENT
Start: 2019-03-15 | End: 2019-03-19

## 2019-03-15 RX ORDER — DIPHENHYDRAMINE HCL 50 MG
50 CAPSULE ORAL ONCE
Qty: 0 | Refills: 0 | Status: DISCONTINUED | OUTPATIENT
Start: 2019-03-15 | End: 2019-03-19

## 2019-03-15 RX ORDER — RISPERIDONE 4 MG/1
1 TABLET ORAL AT BEDTIME
Qty: 0 | Refills: 0 | Status: DISCONTINUED | OUTPATIENT
Start: 2019-03-15 | End: 2019-03-15

## 2019-03-15 RX ORDER — BENZTROPINE MESYLATE 1 MG
0.5 TABLET ORAL ONCE
Qty: 0 | Refills: 0 | Status: COMPLETED | OUTPATIENT
Start: 2019-03-15 | End: 2019-03-15

## 2019-03-15 RX ORDER — IBUPROFEN 200 MG
400 TABLET ORAL EVERY 6 HOURS
Qty: 0 | Refills: 0 | Status: DISCONTINUED | OUTPATIENT
Start: 2019-03-15 | End: 2019-03-19

## 2019-03-15 RX ORDER — LAMOTRIGINE 25 MG/1
150 TABLET, ORALLY DISINTEGRATING ORAL ONCE
Qty: 0 | Refills: 0 | Status: COMPLETED | OUTPATIENT
Start: 2019-03-15 | End: 2019-03-15

## 2019-03-15 RX ORDER — CLONAZEPAM 1 MG
0.5 TABLET ORAL ONCE
Qty: 0 | Refills: 0 | Status: DISCONTINUED | OUTPATIENT
Start: 2019-03-15 | End: 2019-03-15

## 2019-03-15 RX ORDER — LOPERAMIDE HCL 2 MG
2 TABLET ORAL EVERY 6 HOURS
Qty: 0 | Refills: 0 | Status: DISCONTINUED | OUTPATIENT
Start: 2019-03-15 | End: 2019-03-19

## 2019-03-15 RX ORDER — HALOPERIDOL DECANOATE 100 MG/ML
50 INJECTION INTRAMUSCULAR ONCE
Qty: 0 | Refills: 0 | Status: DISCONTINUED | OUTPATIENT
Start: 2019-03-15 | End: 2019-03-15

## 2019-03-15 RX ORDER — HALOPERIDOL DECANOATE 100 MG/ML
5 INJECTION INTRAMUSCULAR AT BEDTIME
Qty: 0 | Refills: 0 | Status: DISCONTINUED | OUTPATIENT
Start: 2019-03-15 | End: 2019-03-19

## 2019-03-15 RX ADMIN — Medication 0.5 MILLIGRAM(S): at 22:28

## 2019-03-15 RX ADMIN — Medication 400 MILLIGRAM(S): at 02:16

## 2019-03-15 RX ADMIN — LAMOTRIGINE 150 MILLIGRAM(S): 25 TABLET, ORALLY DISINTEGRATING ORAL at 22:29

## 2019-03-15 RX ADMIN — Medication 0.5 MILLIGRAM(S): at 22:27

## 2019-03-15 RX ADMIN — Medication 400 MILLIGRAM(S): at 03:16

## 2019-03-15 RX ADMIN — HALOPERIDOL DECANOATE 5 MILLIGRAM(S): 100 INJECTION INTRAMUSCULAR at 22:27

## 2019-03-15 NOTE — PROGRESS NOTE BEHAVIORAL HEALTH - NSBHFUPIPCHARTREVFT_PSY_A_CORE
"34year old domiciled disabled adult Islamic male history of one past psychiatric admission at [facility] (4/3-11/17) history of outpatient treatment at [facility] in [place], medical history of hypertension and ulcerative colitis, no known trauma, legal or substance use history, presents brought to the ED by his wife with report of SI since yesterday and carrying a switchblade in his jacket. He was an impoverished and incomplete historian, but admitted to leaving the house to drive to the beach early this morning, and carrying a switchblade for the past two days as he was feeling paranoid. He admits that an increase in haldol may help with these symptoms, but refuses to accept that hospital admission may be helpful. His wife reported that he described suicidal thoughts in the context of these experiences. He will be involuntarily admitted.   Patient had some difficulty giving a clear explanation of the events leading to his presentation in the ED. His version of the story is as follows: "when I'm having symptoms it hard for me and hard for others to realize" and referenced a conversation they were having last night, then asked for her to be in the room. He accepted the explanation that this part of the evaluation is fully confidential. He went on to explain that a few days ago he put a switchblade in his jacket because he "thought he needed it' to "protect myself' because he was feeling 'paranoia' because his landlord's son is 'out of control using drugs and has escalated from using in his room to selling, and he is concerned that this potentially puts he and his family at risk due to people he doesn't know coming to his property (as he and his landlord live at the same address). In regards to the switchblade, reports his 'attitude has changed' and his wife threw it out and he agrees that it was 'causing him trouble.' When he is asked directly if he has any thoughts or intent to harm himself, he is unable to answer directly, but admits that last night his wife caught him calling a suicide hotline. he is unable to answer direct questions about mood symptoms or questions, but admits to feeling 'stressed' and to waking up early and not getting enough sleep."

## 2019-03-15 NOTE — PATIENT PROFILE BEHAVIORAL HEALTH - NSBHPSYTREAT_PSY_A_CORE
inpt hospitalization [facility] and out patient inpatient hospitalization [facility] and out patient

## 2019-03-16 LAB
ANION GAP SERPL CALC-SCNC: 10 MMOL/L — SIGNIFICANT CHANGE UP (ref 5–17)
BUN SERPL-MCNC: 9 MG/DL — SIGNIFICANT CHANGE UP (ref 7–23)
CALCIUM SERPL-MCNC: 8.7 MG/DL — SIGNIFICANT CHANGE UP (ref 8.5–10.1)
CHLORIDE SERPL-SCNC: 107 MMOL/L — SIGNIFICANT CHANGE UP (ref 96–108)
CO2 SERPL-SCNC: 25 MMOL/L — SIGNIFICANT CHANGE UP (ref 22–31)
CREAT SERPL-MCNC: 0.72 MG/DL — SIGNIFICANT CHANGE UP (ref 0.5–1.3)
GLUCOSE SERPL-MCNC: 108 MG/DL — HIGH (ref 70–99)
MAGNESIUM SERPL-MCNC: 2.3 MG/DL — SIGNIFICANT CHANGE UP (ref 1.6–2.6)
PHOSPHATE SERPL-MCNC: 3.7 MG/DL — SIGNIFICANT CHANGE UP (ref 2.5–4.5)
POTASSIUM SERPL-MCNC: 3.7 MMOL/L — SIGNIFICANT CHANGE UP (ref 3.5–5.3)
POTASSIUM SERPL-SCNC: 3.7 MMOL/L — SIGNIFICANT CHANGE UP (ref 3.5–5.3)
SODIUM SERPL-SCNC: 142 MMOL/L — SIGNIFICANT CHANGE UP (ref 135–145)

## 2019-03-16 PROCEDURE — 99231 SBSQ HOSP IP/OBS SF/LOW 25: CPT

## 2019-03-16 RX ORDER — PROPRANOLOL HCL 160 MG
10 CAPSULE, EXTENDED RELEASE 24HR ORAL
Qty: 0 | Refills: 0 | Status: DISCONTINUED | OUTPATIENT
Start: 2019-03-16 | End: 2019-03-19

## 2019-03-16 RX ORDER — HYDROCHLOROTHIAZIDE 25 MG
12.5 TABLET ORAL DAILY
Qty: 0 | Refills: 0 | Status: DISCONTINUED | OUTPATIENT
Start: 2019-03-16 | End: 2019-03-19

## 2019-03-16 RX ORDER — LOSARTAN POTASSIUM 100 MG/1
100 TABLET, FILM COATED ORAL DAILY
Qty: 0 | Refills: 0 | Status: DISCONTINUED | OUTPATIENT
Start: 2019-03-16 | End: 2019-03-19

## 2019-03-16 RX ADMIN — HALOPERIDOL DECANOATE 5 MILLIGRAM(S): 100 INJECTION INTRAMUSCULAR at 20:27

## 2019-03-16 NOTE — H&P ADULT - NSHPPHYSICALEXAM_GEN_ALL_CORE
Vital Signs Last 24 Hrs  T(C): 36.8 (16 Mar 2019 08:41), Max: 36.8 (16 Mar 2019 08:41)  T(F): 98.2 (16 Mar 2019 08:41), Max: 98.2 (16 Mar 2019 08:41  RR: 15 (16 Mar 2019 08:41) (15 - 15)  SpO2: 98% (16 Mar 2019 08:41) (98% - 98%)

## 2019-03-16 NOTE — H&P ADULT - ASSESSMENT
35 yo M with PMH significant for HTN, Ulcerative colitis, schizoaffective disorder admitted to N for eval. of SI, carrying switchblade in his jacket.    # Depression/SI/ Schizoaffective disorder/ADHD/Psych problems  - Manage as per primary psych team    # Elevated Triglyceride  - Discussed lab results and education provided for importance of healthy low fat diet and exercise  - Repeat lipid panel in 3 months with PCP    # Leucocytosis  - Denies any s/s of infection  - Likely reactive  - Monitor CBC- If py spikes fever consider further w/u     # Utox: Amphetamine  - Pt takes Adderall occasionally    # DVT Ppx  - Encourage Ambulation    IMPROVE VTE Individual Risk Assessment    RISK                                                                Points    [  ] Previous VTE                                                  3    [  ] Thrombophilia                                               2    [  ] Lower limb paralysis                                      2        (unable to hold up >15 seconds)      [  ] Current Cancer                                              2         (within 6 months)    [  ] Immobilization > 24 hrs                                1    [  ] ICU/CCU stay > 24 hours                              1    [  ] Age > 60                                                      1    IMPROVE VTE Score ________0_    Case d/w Dr. Rivera, 33 yo M with PMH significant for HTN, Ulcerative colitis, schizoaffective disorder admitted to N for eval. of SI, carrying switchblade in his jacket.    # Depression/SI/ Schizoaffective disorder/ADHD/Psych problems  - Manage as per primary psych team    # Elevated Triglyceride  - Discussed lab results and education provided for importance of healthy low fat diet and exercise  - Repeat lipid panel in 3 months with PCP    # Leucocytosis  - Denies any s/s of infection  - Likely reactive  - Monitor CBC- If py spikes fever consider further w/u     # Utox: Amphetamine  - Pt takes Adderall occasionally    # Hx of HTN  - Pt reported that he takes Hyzaar at home and dose could be 100 but not sure  - D/w RN to check in AM home meds dose in AM pharmacy and notify hospitalist if any change in dose  - c/w Hyzaar and propranolol for now     # Hx of Ulcerative colitis  - stable  - c/w home meds    # DVT Ppx  - Encourage Ambulation    IMPROVE VTE Individual Risk Assessment    RISK                                                                Points    [  ] Previous VTE                                                  3    [  ] Thrombophilia                                               2    [  ] Lower limb paralysis                                      2        (unable to hold up >15 seconds)      [  ] Current Cancer                                              2         (within 6 months)    [  ] Immobilization > 24 hrs                                1    [  ] ICU/CCU stay > 24 hours                              1    [  ] Age > 60                                                      1    IMPROVE VTE Score ________0_    Case d/w Dr. Rivera, 33 yo M with PMH significant for HTN, Ulcerative colitis, schizoaffective disorder admitted to N for eval. of SI, carrying switchblade in his jacket.    # Depression/SI/ Schizoaffective disorder/ADHD/Psych problems  - Manage as per primary psych team    # Elevated Triglyceride  - Discussed lab results and education provided for importance of healthy low fat diet and exercise  - Repeat lipid panel in 3 months with PCP    # Leucocytosis  - Denies any s/s of infection  - Likely reactive  - Monitor CBC- If py spikes fever consider further w/u     # Utox: Amphetamine  - Pt takes Adderall occasionally    # Hx of HTN  - Pt reported that he takes Hyzaar at home and dose could be 100 but not sure  - D/w RN to check in AM home meds dose in AM pharmacy and notify hospitalist if any change in dose  - c/w Hyzaar and propranolol for now     # Hx of Ulcerative colitis  - stable  - c/w home meds - Pt's family to bring Apriso from home, spoke with pharmacist     # DVT Ppx  - Encourage Ambulation    IMPROVE VTE Individual Risk Assessment    RISK                                                                Points    [  ] Previous VTE                                                  3    [  ] Thrombophilia                                               2    [  ] Lower limb paralysis                                      2        (unable to hold up >15 seconds)      [  ] Current Cancer                                              2         (within 6 months)    [  ] Immobilization > 24 hrs                                1    [  ] ICU/CCU stay > 24 hours                              1    [  ] Age > 60                                                      1    IMPROVE VTE Score ________0_    Case d/w Dr. Rivera,

## 2019-03-16 NOTE — H&P ADULT - HISTORY OF PRESENT ILLNESS
33 yo M with PMH significant for HTN, Ulcerative colitis, schizoaffective disorder admitted to N for eval. of SI, carrying switchblade in his jacket. Pt is resting comfortably in bed. c/o depression, loss of sleep and appetite. Denies headache, dizziness, chest pain, palpitation or SOB. Denies fever, chills, cough, runny nose, congestion, dysuria or diarrhea. Pt was seen examined in presence of Night RN. Medicine consult is called for medical Mx.     PMH: As above  PSH: Pilonidal cyst repair, chest tube placement 2/2 to empyema  Social h/o: Smokes cigs 1PPD x 30 yrs, Denies drinking alcohol, Utox: Amphetamine , Pt takes Adderall at home   Family h/o: Denies family h/o CAD, HTN or HLD. 35 yo M with PMH significant for HTN, Ulcerative colitis, schizoaffective disorder admitted to N for eval. of SI, carrying switchblade in his jacket. Pt is resting comfortably in bed. c/o depression, loss of sleep and appetite. Denies headache, dizziness, chest pain, palpitation or SOB. Denies fever, chills, cough, runny nose, congestion, dysuria or diarrhea. Pt was seen examined in presence of Night RN. Medicine consult is called for medical Mx.     PMH: As above  PSH: Pilonidal cyst repair, chest tube placement 2/2 to empyema  Social h/o: Smokes cigs 1PPD x 20 yrs, Denies drinking alcohol, Utox: Amphetamine , Pt takes Adderall at home   Family h/o: Denies family h/o CAD, HTN or HLD.

## 2019-03-17 PROCEDURE — 99231 SBSQ HOSP IP/OBS SF/LOW 25: CPT

## 2019-03-17 RX ADMIN — Medication 10 MILLIGRAM(S): at 21:07

## 2019-03-17 RX ADMIN — Medication 12.5 MILLIGRAM(S): at 09:10

## 2019-03-17 RX ADMIN — LOSARTAN POTASSIUM 100 MILLIGRAM(S): 100 TABLET, FILM COATED ORAL at 09:10

## 2019-03-17 RX ADMIN — HALOPERIDOL DECANOATE 5 MILLIGRAM(S): 100 INJECTION INTRAMUSCULAR at 21:07

## 2019-03-17 NOTE — CHART NOTE - NSCHARTNOTEFT_GEN_A_CORE
Completed Safety Plan with patient.  He is able to identify his early warning signs and stressors.  He has multiple supports in place both personal and professional.  He describes his family as his primary reasons for living.

## 2019-03-18 DIAGNOSIS — F20.9 SCHIZOPHRENIA, UNSPECIFIED: ICD-10-CM

## 2019-03-18 PROCEDURE — 99232 SBSQ HOSP IP/OBS MODERATE 35: CPT

## 2019-03-18 RX ORDER — CITALOPRAM 10 MG/1
1 TABLET, FILM COATED ORAL
Qty: 14 | Refills: 0 | OUTPATIENT
Start: 2019-03-18 | End: 2019-03-31

## 2019-03-18 RX ORDER — DOCUSATE SODIUM 100 MG
1 CAPSULE ORAL
Qty: 42 | Refills: 0 | OUTPATIENT
Start: 2019-03-18 | End: 2019-03-31

## 2019-03-18 RX ORDER — BENZTROPINE MESYLATE 1 MG
1 TABLET ORAL
Qty: 28 | Refills: 0 | OUTPATIENT
Start: 2019-03-18 | End: 2019-03-31

## 2019-03-18 RX ORDER — PROPRANOLOL HCL 160 MG
1 CAPSULE, EXTENDED RELEASE 24HR ORAL
Qty: 28 | Refills: 0 | OUTPATIENT
Start: 2019-03-18 | End: 2019-03-31

## 2019-03-18 RX ORDER — HALOPERIDOL DECANOATE 100 MG/ML
1 INJECTION INTRAMUSCULAR
Qty: 14 | Refills: 0 | OUTPATIENT
Start: 2019-03-18 | End: 2019-03-31

## 2019-03-18 RX ORDER — CITALOPRAM 10 MG/1
20 TABLET, FILM COATED ORAL DAILY
Qty: 0 | Refills: 0 | Status: DISCONTINUED | OUTPATIENT
Start: 2019-03-18 | End: 2019-03-19

## 2019-03-18 RX ORDER — LOSARTAN POTASSIUM 100 MG/1
1 TABLET, FILM COATED ORAL
Qty: 14 | Refills: 0 | OUTPATIENT
Start: 2019-03-18 | End: 2019-03-31

## 2019-03-18 RX ADMIN — Medication 12.5 MILLIGRAM(S): at 09:13

## 2019-03-18 RX ADMIN — LOSARTAN POTASSIUM 100 MILLIGRAM(S): 100 TABLET, FILM COATED ORAL at 09:13

## 2019-03-18 RX ADMIN — Medication 10 MILLIGRAM(S): at 21:03

## 2019-03-18 RX ADMIN — Medication 10 MILLIGRAM(S): at 09:13

## 2019-03-18 RX ADMIN — HALOPERIDOL DECANOATE 5 MILLIGRAM(S): 100 INJECTION INTRAMUSCULAR at 21:03

## 2019-03-18 NOTE — DISCHARGE NOTE BEHAVIORAL HEALTH - NSTOBACCOHOTLINE_GEN_A_CS
[facility] Smokers Quitline (992-QG-TPRYP) [facility] Smokers Quitline (953-CG-TPSUP) [facility] Smokers Quitline (560-YY-NGLMF)

## 2019-03-18 NOTE — DISCHARGE NOTE BEHAVIORAL HEALTH - NSBHDCMEDSFT_PSY_A_CORE
restarted home meds haldol celexa width good response and no side effects. restarted home meds haldol celexa width good response and no side effects.    Patient educated to not stop any medication unless otherwise told to do so by outpatient provider restarted home meds haldol Celexa width good response and no side effects.    Patient educated to not stop any medication unless otherwise told to do so by outpatient provider

## 2019-03-18 NOTE — DISCHARGE NOTE BEHAVIORAL HEALTH - NSTOBACCOREFERRAL_GEN_A_CS
Yes Pt declined referral for smoking cessation treatment at time of admission and at discharge./Patient declined information Pt declined referral for smoking cessation treatment at time of admission and at discharge./Yes Pt declined referral for smoking cessation treatment at time of admission and at discharge.

## 2019-03-18 NOTE — DISCHARGE NOTE BEHAVIORAL HEALTH - NSBHDCCRISISPLAN1FT_PSY_A_CORE
Talk to a trusted family member, friend or my therapist and ask for help.  Call the Suicide Hotline at 1-590.684.4782  Call 911 or go to my nearest hospital emergency room

## 2019-03-18 NOTE — DISCHARGE NOTE BEHAVIORAL HEALTH - HPI (INCLUDE ILLNESS QUALITY, SEVERITY, DURATION, TIMING, CONTEXT, MODIFYING FACTORS, ASSOCIATED SIGNS AND SYMPTOMS)
" ON admission 34year old domiciled disabled adult Islamic male history of one past psychiatric admission at [facility] (4/3-11/17) history of outpatient treatment at [facility] in [place], medical history of hypertension and ulcerative colitis, no known trauma, legal or substance use history, presents brought to the ED by his wife with report of SI since yesterday and carrying a switchblade in his jacket. He was seen via telepsychiatry in a private room with a 1:1 present. Patient had some difficulty giving a clear explanation of the events leading to his presentation in the ED. His version of the story is as follows: "when I'm having symptoms it hard for me and hard for others to realize" and referenced a conversation they were having last night, then asked for her to be in the room. He accepted the explanation that this part of the evaluation is fully confidential. He went on to explain that a few days ago he put a switchblade in his jacket because he "thought he needed it' to "protect myself' because he was feeling 'paranoia' because his landlord's son is 'out of control using drugs and has escalated from using in his room to selling, and he is concerned that this potentially puts he and his family at risk due to people he doesn't know coming to his property (as he and his landlord live at the same address). In regards to the switchblade, reports his 'attitude has changed' and his wife threw it out and he agrees that it was 'causing him trouble.' When he is asked directly if he has any thoughts or intent to harm himself, he is unable to answer directly, but admits that last night his wife caught him calling a suicide hotline. he is unable to answer direct questions about mood symptoms or questions, but admits to feeling 'stressed' and to waking up early and not getting enough sleep.     Collateral: Collateral provided by wife, Jackeline Cade [phone], daily contact, reliability is high. Per wife the PHI, starts 3 days ago and progressed into this morning. 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  Since November patient has been seemingly depressed, in the context of isolating in the house and reporting that he feels hopeless because he is not working and at home on disability. For the past week, patient has not been sleeping, and believes the reason is because he is taking too many medications. Wife reports patient is prescribed Trazadone, Adderall (dosage and frequencies) amongst other medications, [place] [facility] [place] [phone]. Yesterday, he reported to his wife that 3 days ago he was having thoughts of hurting himself and believes people “are out to get him”. Early this morning, around 5 am, she noticed that patient wasn’t home. When she called him, patient reported that he was at the beach with his sleeping bag, because he couldn’t sleep and was waking up in cold sweats. She reports he drove to the beach an hour away from his home, rather than to the beach that was about 15 minutes away from his home. She asked him to drive back home. Upon arrival, patient was still seemingly paranoid, in the context of still believing people are out to get him.     At baseline, wife describes patient as a mildly sad, but loving caring father. He was hospitalized at [facility] in April 2017, due suicidal ideations and symptoms of psychosis in the context of hearing voices telling him to kill himself. He was discharged with the recommendation to follow up at [facility]. He has been compliant with treatment. He has no trauma, abuse,  or legal history. He has no history of ETOH or illicit drug use. Wife reports he has no access to weapons, guns or knives; however, per the ED report, patient has been walking around with a small switch blade in his pocket. Wife has significant safety concerns but is amendable to a discharge home."      ON the unit, cooperative taking meds and denies SI since admission. NO agitation or behavioral problems.

## 2019-03-18 NOTE — DISCHARGE NOTE BEHAVIORAL HEALTH - MEDICATION SUMMARY - MEDICATIONS TO STOP TAKING
I will STOP taking the medications listed below when I get home from the hospital:    Haldol Decanoate  -- 50  by mouth 2 times a day    clonazePAM 0.5 mg oral tablet  --  by mouth once a day    benztropine 0.5 mg oral tablet  --  by mouth 2 times a day

## 2019-03-18 NOTE — DISCHARGE NOTE BEHAVIORAL HEALTH - NSBHDCPURPOSE1FT_PSY_A_CORE
Pt has an appt with his therapist, Rolanda Davis LCSW, for psychiatric follow-up on Thur. 3/21/2019 at 12:30PM

## 2019-03-18 NOTE — DISCHARGE NOTE BEHAVIORAL HEALTH - NSBHDCTESTSFT_PSY_A_CORE
HA1C=  Thyroid=  Lipid Panel  Cholesterol =  Triglycerides =  HDL =  LDL=  Other: HA1C=  Thyroid=   Lipid Panel  Cholesterol =159  Triglycerides =206  HDL =25  LDL=93  Other: HA1C=  Thyroid= 1.25  Lipid Panel  Cholesterol =159  Triglycerides =206  HDL =25  LDL=93  Other: HA1C=5.6  Thyroid= 1.25  Lipid Panel  Cholesterol =159  Triglycerides =206  HDL =25  LDL=93  Other:

## 2019-03-18 NOTE — DISCHARGE NOTE BEHAVIORAL HEALTH - MEDICATION SUMMARY - MEDICATIONS TO TAKE
I will START or STAY ON the medications listed below when I get home from the hospital:    losartan 100 mg oral tablet  -- 1 tab(s) by mouth once a day  -- Indication: For HTN (hypertension)    propranolol 10 mg oral tablet  -- 1 tab(s) by mouth 2 times a day  -- Indication: For HTN (hypertension)    citalopram 20 mg oral tablet  -- 1 tab(s) by mouth once a day  -- Indication: For depression     benztropine 0.5 mg oral tablet  -- 1 tab(s) by mouth 2 times a day   -- Indication: For Schizophrenia    haloperidol 5 mg oral tablet  -- 1 tab(s) by mouth once a day (at bedtime)  -- Indication: For Schizophrenia    hydroCHLOROthiazide 12.5 mg oral capsule  -- 1 cap(s) by mouth once a day  -- Indication: For HTN (hypertension)    docusate sodium 100 mg oral capsule  -- 1 cap(s) by mouth 3 times a day  -- Indication: For constipation

## 2019-03-18 NOTE — DISCHARGE NOTE BEHAVIORAL HEALTH - NSBHDCSWCOMMENTSFT_PSY_A_CORE
Pt/caregiver educated about the signs and symptoms of mental illness, need and resources for continuing in appropriate level of psychiatric outpatient treatment and the need to continue taking the above medications as prescribed unless directed otherwise by his outpatient provider.

## 2019-03-19 VITALS — RESPIRATION RATE: 16 BRPM | TEMPERATURE: 98 F | OXYGEN SATURATION: 99 %

## 2019-03-19 DIAGNOSIS — Z71.89 OTHER SPECIFIED COUNSELING: ICD-10-CM

## 2019-03-19 LAB
HBA1C BLD-MCNC: 5.6 % — SIGNIFICANT CHANGE UP (ref 4–5.6)
TSH SERPL-MCNC: 1.25 UU/ML — SIGNIFICANT CHANGE UP (ref 0.34–4.82)

## 2019-03-19 PROCEDURE — 99231 SBSQ HOSP IP/OBS SF/LOW 25: CPT

## 2019-03-19 PROCEDURE — 99232 SBSQ HOSP IP/OBS MODERATE 35: CPT

## 2019-03-19 RX ADMIN — CITALOPRAM 20 MILLIGRAM(S): 10 TABLET, FILM COATED ORAL at 09:19

## 2019-03-19 RX ADMIN — Medication 12.5 MILLIGRAM(S): at 09:19

## 2019-03-19 RX ADMIN — Medication 10 MILLIGRAM(S): at 09:19

## 2019-03-19 RX ADMIN — Medication 100 MILLIGRAM(S): at 09:19

## 2019-03-19 RX ADMIN — LOSARTAN POTASSIUM 100 MILLIGRAM(S): 100 TABLET, FILM COATED ORAL at 09:19

## 2019-03-19 NOTE — PROGRESS NOTE BEHAVIORAL HEALTH - PRIMARY DX
Schizophrenia, acute undifferentiated
Schizophrenia, unspecified type
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
## 2019-03-19 NOTE — PROGRESS NOTE BEHAVIORAL HEALTH - NSBHADMITDANGERSELF_PSY_A_CORE
unable to care for self
suicidal ideation with plan and means
unable to care for self

## 2019-03-19 NOTE — PROGRESS NOTE BEHAVIORAL HEALTH - SUMMARY
Pt is a 34 year old domiciled disabled adult Islamic male history of one past psychiatric admission at [facility] (4/3-11/17) history of outpatient treatment at [facility] in [place], medical history of hypertension and ulcerative colitis, no known trauma, legal or substance use history, presents brought to the ED by his wife with report of SI  and carrying a switchblade in his jacket.   3/19.  Pt mood is stable and pt looking forward to discharge today.  Denies SI/H/AH delusions and no evidence of hannah or psychosis.  Admits to disorganized thought upon admission to ED and impaired sleep, now resolved.  Pt for follow up at [facility] with Dr Blankenship.
Pt is a 34 year old domiciled disabled adult Islamic male history of one past psychiatric admission at [facility] (4/3-11/17) history of outpatient treatment at [facility] in [place], medical history of hypertension and ulcerative colitis, no known trauma, legal or substance use history, presents brought to the ED by his wife with report of SI  and carrying a switchblade in his jacket.   ON then unit pt presents calm and cooperative, slow, needs redirection.
Pt is a 34 year old domiciled disabled adult Islamic male history of one past psychiatric admission at [facility] (4/3-11/17) history of outpatient treatment at [facility] in [place], medical history of hypertension and ulcerative colitis, no known trauma, legal or substance use history, presents brought to the ED by his wife with report of SI  and carrying a switchblade in his jacket.   ON then unit pt presents calm and cooperative, slow, needs redirection.
Pt is a 34 year old domiciled disabled adult Islamic male history of one past psychiatric admission at [facility] (4/3-11/17) history of outpatient treatment at [facility] in [place], medical history of hypertension and ulcerative colitis, no known trauma, legal or substance use history, presents brought to the ED by his wife with report of SI  and carrying a switchblade in his jacket.   ON then unit pt presents calm and cooperative, slow, needs redirection.
Pt is a 34 year old domiciled disabled adult Islamic male history of one past psychiatric admission at [facility] (4/3-11/17) history of outpatient treatment at [facility] in [place], medical history of hypertension and ulcerative colitis, no known trauma, legal or substance use history, presents brought to the ED by his wife with report of SI  and carrying a switchblade in his jacket.   ON then unit pt presents calm and cooperative, responded to haldol 5 mg, improved, no side effect observed or reported.

## 2019-03-19 NOTE — PROGRESS NOTE BEHAVIORAL HEALTH - NSBHATTESTSEENBY_PSY_A_CORE
attending Psychiatrist without NP/Trainee
NP with telephonic supervision from Attending Psychiatrist
attending Psychiatrist without NP/Trainee

## 2019-03-19 NOTE — PROGRESS NOTE BEHAVIORAL HEALTH - NSBHCHARTREVIEWINVESTIGATE_PSY_A_CORE FT
QTC-424
Ventricular Rate 78 BPM    Atrial Rate 78 BPM    P-R Interval 148 ms    QRS Duration 84 ms    Q-T Interval 372 ms    QTC Calculation(Bezet) 424 ms    P Axis 61 degrees    R Axis 36 degrees    T Axis 34 degrees    Diagnosis Line Normal sinus rhythm with sinus arrhythmia  Normal ECG  No previous ECGs available  Confirmed by Azar Perdue MD ([id number]) on 3/15/2019 10:28:10 PM

## 2019-03-19 NOTE — PROGRESS NOTE BEHAVIORAL HEALTH - NSBHADMITMEDEDUDETAILS_A_CORE FT
Discussed risks/benefits/s-e
Discussed risks/benefits/s-e  Pt reports he had been prescribed Adderall in past and was advised may cause psychosis and should be avoided.  Was not prescribed while in Pt.
side effects and benefits discussed.

## 2019-03-19 NOTE — PROGRESS NOTE BEHAVIORAL HEALTH - RISK ASSESSMENT
Pt is at high long term risk considering psych hx, calling suicide hot line, carrying a knife.   His protective factors are supportive family and future orientation,.

## 2019-03-19 NOTE — PROGRESS NOTE BEHAVIORAL HEALTH - AXIS III
hypertension, ulcerative colitis

## 2019-03-19 NOTE — PROGRESS NOTE BEHAVIORAL HEALTH - NSBHCHARTREVIEWLAB_PSY_A_CORE FT
03-15    142  |  110<H>  |  14  ----------------------------<  101<H>  3.9   |  24  |  0.71    Ca    8.8      15 Mar 2019 06:58  Phos  4.5     03-15  Mg     2.4     03-15    TPro  7.8  /  Alb  3.5  /  TBili  0.3  /  DBili  <0.1  /  AST  23  /  ALT  39  /  AlkPhos  64  03-15                          15.6   10.65 )-----------( 281      ( 14 Mar 2019 19:26 )             46.1
Lipid Profile (03.15.19 @ 06:58)    Total Cholesterol/HDL Ratio Measurement: 6.4 RATIO    Cholesterol, Serum: 159 mg/dL    Triglycerides, Serum: 206 mg/dL    HDL Cholesterol, Serum: 25: HDL Levels >/= 60 mg/dL are considered beneficial and a "negative" risk  factor.  Effective 08/15/2018: New reference range and interpretive comment. mg/dL    Direct LDL: 93: LDL Cholesterol (mg/dL) --- Interpretive Comment (for adults 18 and over)  Optimal LDL Level may vary based on clinical situation  Below 70                   Ideal for people at very high risk of heart    a1c and Thyroid pending  disease  Below 100                  Ideal for people at risk of heart disease  100 - 129                    [place]  130 - 159                    Borderline high  160 - 189                    High  190 and Above           Very high mg/dL

## 2019-03-19 NOTE — PROGRESS NOTE BEHAVIORAL HEALTH - NSBHCHARTREVIEWVS_PSY_A_CORE FT
Vital Signs Last 24 Hrs  T(C): 36.4 (15 Mar 2019 08:05), Max: 37.1 (14 Mar 2019 17:50)  T(F): 97.6 (15 Mar 2019 08:05), Max: 98.8 (14 Mar 2019 17:50)  HR: 105 (15 Mar 2019 00:51) (105 - 111)  BP: 140/72 (15 Mar 2019 00:51) (140/72 - 149/98)  BP(mean): --  ABP: --  ABP(mean): --  RR: 18 (15 Mar 2019 00:51) (18 - 18)  SpO2: 100% (15 Mar 2019 00:51) (100% - 100%)
Vital Signs Last 24 Hrs  T(C): 36.4 (15 Mar 2019 08:05), Max: 37.1 (14 Mar 2019 17:50)  T(F): 97.6 (15 Mar 2019 08:05), Max: 98.8 (14 Mar 2019 17:50)  HR: 105 (15 Mar 2019 00:51) (105 - 111)  BP: 140/72 (15 Mar 2019 00:51) (140/72 - 149/98)  BP(mean): --  ABP: --  ABP(mean): --  RR: 18 (15 Mar 2019 00:51) (18 - 18)  SpO2: 100% (15 Mar 2019 00:51) (100% - 100%)
Vital Signs Last 24 Hrs  T(C): 36.8 (18 Mar 2019 09:00), Max: 36.9 (17 Mar 2019 21:00)  T(F): 98.3 (18 Mar 2019 09:00), Max: 98.5 (17 Mar 2019 21:00)  HR: -- 96 sit and 103  BP: -- 133/80 sit and 142/89 stand    RR: 14 (18 Mar 2019 09:00) (14 - 16)  SpO2: 100% (18 Mar 2019 09:00) (100% - 100%)
ICU Vital Signs Last 24 Hrs  T(C): 36.8 (19 Mar 2019 09:18), Max: 36.8 (19 Mar 2019 09:18)  T(F): 98.2 (19 Mar 2019 09:18), Max: 98.2 (19 Mar 2019 09:18)  HR: --88, standing 108  BP: --138/66 standing 110/85  BP(mean): --  ABP: --  ABP(mean): --  RR: 16 (19 Mar 2019 09:18) (16 - 16)  SpO2: 99% (19 Mar 2019 09:18) (99% - 99%)
Vital Signs Last 24 Hrs  T(C): 36.4 (15 Mar 2019 08:05), Max: 37.1 (14 Mar 2019 17:50)  T(F): 97.6 (15 Mar 2019 08:05), Max: 98.8 (14 Mar 2019 17:50)  HR: 105 (15 Mar 2019 00:51) (105 - 111)  BP: 140/72 (15 Mar 2019 00:51) (140/72 - 149/98)  BP(mean): --  ABP: --  ABP(mean): --  RR: 18 (15 Mar 2019 00:51) (18 - 18)  SpO2: 100% (15 Mar 2019 00:51) (100% - 100%)

## 2019-03-19 NOTE — PROGRESS NOTE BEHAVIORAL HEALTH - NSBHFUPINTERVALCCFT_PSY_A_CORE
" Do you think I can go on Monday  "
"I feel better, I am ready for d/c"
"I feel pretty good.  I'm bored.  I just want to get out of here"!
" Do you think I can be discharged tomorrow  "
"I had a knife "

## 2019-03-19 NOTE — PROGRESS NOTE BEHAVIORAL HEALTH - PROBLEM/PLAN-1
Patient presents for depo shot. Urine pregnancy is negative. Patient given 1ml injection IM in right deltoid. Patient tolerated well with no signs of infection.  
DISPLAY PLAN FREE TEXT
DISPLAY PLAN FREE TEXT

## 2019-03-21 DIAGNOSIS — F17.210 NICOTINE DEPENDENCE, CIGARETTES, UNCOMPLICATED: ICD-10-CM

## 2019-03-21 DIAGNOSIS — K51.90 ULCERATIVE COLITIS, UNSPECIFIED, WITHOUT COMPLICATIONS: ICD-10-CM

## 2019-03-21 DIAGNOSIS — I10 ESSENTIAL (PRIMARY) HYPERTENSION: ICD-10-CM

## 2019-03-21 DIAGNOSIS — E78.1 PURE HYPERGLYCERIDEMIA: ICD-10-CM

## 2019-03-21 DIAGNOSIS — F32.9 MAJOR DEPRESSIVE DISORDER, SINGLE EPISODE, UNSPECIFIED: ICD-10-CM

## 2019-03-21 DIAGNOSIS — F25.9 SCHIZOAFFECTIVE DISORDER, UNSPECIFIED: ICD-10-CM

## 2019-03-21 DIAGNOSIS — R45.851 SUICIDAL IDEATIONS: ICD-10-CM

## 2019-03-21 DIAGNOSIS — F90.9 ATTENTION-DEFICIT HYPERACTIVITY DISORDER, UNSPECIFIED TYPE: ICD-10-CM

## 2019-03-21 DIAGNOSIS — D72.829 ELEVATED WHITE BLOOD CELL COUNT, UNSPECIFIED: ICD-10-CM

## 2019-05-01 PROCEDURE — G9005: CPT

## 2019-05-11 ENCOUNTER — TRANSCRIPTION ENCOUNTER (OUTPATIENT)
Age: 34
End: 2019-05-11

## 2019-08-20 NOTE — ED PROVIDER NOTE - TEMPLATE, MLM
High Dose Vitamin A Pregnancy And Lactation Text: High dose vitamin A therapy is contraindicated during pregnancy and breast feeding. Topical Sulfur Applications Counseling: Topical Sulfur Counseling: Patient counseled that this medication may cause skin irritation or allergic reactions.  In the event of skin irritation, the patient was advised to reduce the amount of the drug applied or use it less frequently.   The patient verbalized understanding of the proper use and possible adverse effects of topical sulfur application.  All of the patient's questions and concerns were addressed. Detail Level: Zone Tetracycline Counseling: Patient counseled regarding possible photosensitivity and increased risk for sunburn.  Patient instructed to avoid sunlight, if possible.  When exposed to sunlight, patients should wear protective clothing, sunglasses, and sunscreen.  The patient was instructed to call the office immediately if the following severe adverse effects occur:  hearing changes, easy bruising/bleeding, severe headache, or vision changes.  The patient verbalized understanding of the proper use and possible adverse effects of tetracycline.  All of the patient's questions and concerns were addressed. Patient understands to avoid pregnancy while on therapy due to potential birth defects. Doxycycline Pregnancy And Lactation Text: This medication is Pregnancy Category D and not consider safe during pregnancy. It is also excreted in breast milk but is considered safe for shorter treatment courses. Erythromycin Pregnancy And Lactation Text: This medication is Pregnancy Category B and is considered safe during pregnancy. It is also excreted in breast milk. Tazorac Counseling:  Patient advised that medication is irritating and drying.  Patient may need to apply sparingly and wash off after an hour before eventually leaving it on overnight.  The patient verbalized understanding of the proper use and possible adverse effects of tazorac.  All of the patient's questions and concerns were addressed. Topical Clindamycin Counseling: Patient counseled that this medication may cause skin irritation or allergic reactions.  In the event of skin irritation, the patient was advised to reduce the amount of the drug applied or use it less frequently.   The patient verbalized understanding of the proper use and possible adverse effects of clindamycin.  All of the patient's questions and concerns were addressed. VIEW ALL Azithromycin Counseling:  I discussed with the patient the risks of azithromycin including but not limited to GI upset, allergic reaction, drug rash, diarrhea, and yeast infections. Use Enhanced Medication Counseling?: No Birth Control Pills Pregnancy And Lactation Text: This medication should be avoided if pregnant and for the first 30 days post-partum. Azithromycin Pregnancy And Lactation Text: This medication is considered safe during pregnancy and is also secreted in breast milk. Spironolactone Counseling: Patient advised regarding risks of diarrhea, abdominal pain, hyperkalemia, birth defects (for female patients), liver toxicity and renal toxicity. The patient may need blood work to monitor liver and kidney function and potassium levels while on therapy. The patient verbalized understanding of the proper use and possible adverse effects of spironolactone.  All of the patient's questions and concerns were addressed. Bactrim Counseling:  I discussed with the patient the risks of sulfa antibiotics including but not limited to GI upset, allergic reaction, drug rash, diarrhea, dizziness, photosensitivity, and yeast infections.  Rarely, more serious reactions can occur including but not limited to aplastic anemia, agranulocytosis, methemoglobinemia, blood dyscrasias, liver or kidney failure, lung infiltrates or desquamative/blistering drug rashes. Tetracycline Pregnancy And Lactation Text: This medication is Pregnancy Category D and not consider safe during pregnancy. It is also excreted in breast milk. Dapsone Counseling: I discussed with the patient the risks of dapsone including but not limited to hemolytic anemia, agranulocytosis, rashes, methemoglobinemia, kidney failure, peripheral neuropathy, headaches, GI upset, and liver toxicity.  Patients who start dapsone require monitoring including baseline LFTs and weekly CBCs for the first month, then every month thereafter.  The patient verbalized understanding of the proper use and possible adverse effects of dapsone.  All of the patient's questions and concerns were addressed. Benzoyl Peroxide Counseling: Patient counseled that medicine may cause skin irritation and bleach clothing.  In the event of skin irritation, the patient was advised to reduce the amount of the drug applied or use it less frequently.   The patient verbalized understanding of the proper use and possible adverse effects of benzoyl peroxide.  All of the patient's questions and concerns were addressed. High Dose Vitamin A Counseling: Side effects reviewed, pt to contact office should one occur. Benzoyl Peroxide Pregnancy And Lactation Text: This medication is Pregnancy Category C. It is unknown if benzoyl peroxide is excreted in breast milk. Spironolactone Pregnancy And Lactation Text: This medication can cause feminization of the male fetus and should be avoided during pregnancy. The active metabolite is also found in breast milk. Birth Control Pills Counseling: Birth Control Pill Counseling: I discussed with the patient the potential side effects of OCPs including but not limited to increased risk of stroke, heart attack, thrombophlebitis, deep venous thrombosis, hepatic adenomas, breast changes, GI upset, headaches, and depression.  The patient verbalized understanding of the proper use and possible adverse effects of OCPs. All of the patient's questions and concerns were addressed. Topical Retinoid counseling:  Patient advised to apply a pea-sized amount only at bedtime and wait 30 minutes after washing their face before applying.  If too drying, patient may add a non-comedogenic moisturizer. The patient verbalized understanding of the proper use and possible adverse effects of retinoids.  All of the patient's questions and concerns were addressed. Bactrim Pregnancy And Lactation Text: This medication is Pregnancy Category D and is known to cause fetal risk.  It is also excreted in breast milk. Isotretinoin Pregnancy And Lactation Text: This medication is Pregnancy Category X and is considered extremely dangerous during pregnancy. It is unknown if it is excreted in breast milk. Isotretinoin Counseling: Patient should get monthly blood tests, not donate blood, not drive at night if vision affected, not share medication, and not undergo elective surgery for 6 months after tx completed. Side effects reviewed, pt to contact office should one occur. Topical Retinoid Pregnancy And Lactation Text: This medication is Pregnancy Category C. It is unknown if this medication is excreted in breast milk. Erythromycin Counseling:  I discussed with the patient the risks of erythromycin including but not limited to GI upset, allergic reaction, drug rash, diarrhea, increase in liver enzymes, and yeast infections. Tazorac Pregnancy And Lactation Text: This medication is not safe during pregnancy. It is unknown if this medication is excreted in breast milk. Minocycline Counseling: Patient advised regarding possible photosensitivity and discoloration of the teeth, skin, lips, tongue and gums.  Patient instructed to avoid sunlight, if possible.  When exposed to sunlight, patients should wear protective clothing, sunglasses, and sunscreen.  The patient was instructed to call the office immediately if the following severe adverse effects occur:  hearing changes, easy bruising/bleeding, severe headache, or vision changes.  The patient verbalized understanding of the proper use and possible adverse effects of minocycline.  All of the patient's questions and concerns were addressed. Topical Clindamycin Pregnancy And Lactation Text: This medication is Pregnancy Category B and is considered safe during pregnancy. It is unknown if it is excreted in breast milk. Dapsone Pregnancy And Lactation Text: This medication is Pregnancy Category C and is not considered safe during pregnancy or breast feeding. Topical Sulfur Applications Pregnancy And Lactation Text: This medication is Pregnancy Category C and has an unknown safety profile during pregnancy. It is unknown if this topical medication is excreted in breast milk. Doxycycline Counseling:  Patient counseled regarding possible photosensitivity and increased risk for sunburn.  Patient instructed to avoid sunlight, if possible.  When exposed to sunlight, patients should wear protective clothing, sunglasses, and sunscreen.  The patient was instructed to call the office immediately if the following severe adverse effects occur:  hearing changes, easy bruising/bleeding, severe headache, or vision changes.  The patient verbalized understanding of the proper use and possible adverse effects of doxycycline.  All of the patient's questions and concerns were addressed. Detail Level: Detailed Detail Level: Generalized

## 2019-08-23 NOTE — ED ADULT NURSE REASSESSMENT NOTE - COMFORT CARE
darkened lights/po fluids offered/warm blanket provided
po fluids offered/warm blanket provided/darkened lights
Yes

## 2019-10-01 ENCOUNTER — OUTPATIENT (OUTPATIENT)
Dept: OUTPATIENT SERVICES | Facility: HOSPITAL | Age: 34
LOS: 1 days | End: 2019-10-01
Payer: MEDICARE

## 2019-10-01 DIAGNOSIS — L05.91 PILONIDAL CYST WITHOUT ABSCESS: Chronic | ICD-10-CM

## 2019-10-01 PROCEDURE — G9005: CPT

## 2019-10-24 DIAGNOSIS — Z76.89 PERSONS ENCOUNTERING HEALTH SERVICES IN OTHER SPECIFIED CIRCUMSTANCES: ICD-10-CM

## 2019-10-24 DIAGNOSIS — Z71.89 OTHER SPECIFIED COUNSELING: ICD-10-CM

## 2019-10-24 PROBLEM — I10 ESSENTIAL (PRIMARY) HYPERTENSION: Chronic | Status: ACTIVE | Noted: 2019-03-14

## 2019-10-24 PROBLEM — K51.90 ULCERATIVE COLITIS, UNSPECIFIED, WITHOUT COMPLICATIONS: Chronic | Status: ACTIVE | Noted: 2019-03-14

## 2020-01-14 ENCOUNTER — TRANSCRIPTION ENCOUNTER (OUTPATIENT)
Age: 35
End: 2020-01-14

## 2020-02-13 ENCOUNTER — TRANSCRIPTION ENCOUNTER (OUTPATIENT)
Age: 35
End: 2020-02-13

## 2020-07-11 ENCOUNTER — OUTPATIENT (OUTPATIENT)
Dept: OUTPATIENT SERVICES | Facility: HOSPITAL | Age: 35
LOS: 1 days | End: 2020-07-11
Payer: MEDICAID

## 2020-07-11 DIAGNOSIS — L05.91 PILONIDAL CYST WITHOUT ABSCESS: Chronic | ICD-10-CM

## 2020-07-11 DIAGNOSIS — Z11.59 ENCOUNTER FOR SCREENING FOR OTHER VIRAL DISEASES: ICD-10-CM

## 2020-07-11 PROCEDURE — U0003: CPT

## 2020-07-12 DIAGNOSIS — Z11.59 ENCOUNTER FOR SCREENING FOR OTHER VIRAL DISEASES: ICD-10-CM

## 2020-07-12 LAB — SARS-COV-2 RNA SPEC QL NAA+PROBE: SIGNIFICANT CHANGE UP

## 2020-09-06 ENCOUNTER — TRANSCRIPTION ENCOUNTER (OUTPATIENT)
Age: 35
End: 2020-09-06

## 2020-09-09 ENCOUNTER — APPOINTMENT (OUTPATIENT)
Dept: NEUROLOGY | Facility: CLINIC | Age: 35
End: 2020-09-09
Payer: MEDICAID

## 2020-09-09 VITALS
DIASTOLIC BLOOD PRESSURE: 70 MMHG | HEIGHT: 74 IN | TEMPERATURE: 98.1 F | WEIGHT: 314 LBS | SYSTOLIC BLOOD PRESSURE: 128 MMHG | BODY MASS INDEX: 40.3 KG/M2

## 2020-09-09 DIAGNOSIS — Z86.79 PERSONAL HISTORY OF OTHER DISEASES OF THE CIRCULATORY SYSTEM: ICD-10-CM

## 2020-09-09 DIAGNOSIS — Z86.59 PERSONAL HISTORY OF OTHER MENTAL AND BEHAVIORAL DISORDERS: ICD-10-CM

## 2020-09-09 DIAGNOSIS — Z86.39 PERSONAL HISTORY OF OTHER ENDOCRINE, NUTRITIONAL AND METABOLIC DISEASE: ICD-10-CM

## 2020-09-09 DIAGNOSIS — F17.200 NICOTINE DEPENDENCE, UNSPECIFIED, UNCOMPLICATED: ICD-10-CM

## 2020-09-09 PROCEDURE — 99204 OFFICE O/P NEW MOD 45 MIN: CPT

## 2020-09-09 RX ORDER — MONTELUKAST 10 MG/1
10 TABLET, FILM COATED ORAL
Refills: 0 | Status: ACTIVE | COMMUNITY

## 2020-09-09 RX ORDER — GLIMEPIRIDE 1 MG/1
1 TABLET ORAL
Refills: 0 | Status: ACTIVE | COMMUNITY

## 2020-09-09 RX ORDER — LOSARTAN POTASSIUM AND HYDROCHLOROTHIAZIDE 25; 100 MG/1; MG/1
100-25 TABLET ORAL
Refills: 0 | Status: ACTIVE | COMMUNITY

## 2020-09-09 RX ORDER — CLONAZEPAM 2 MG/1
2 TABLET ORAL
Refills: 0 | Status: ACTIVE | COMMUNITY

## 2020-09-09 RX ORDER — LURASIDONE HYDROCHLORIDE 60 MG/1
TABLET, FILM COATED ORAL
Refills: 0 | Status: ACTIVE | COMMUNITY

## 2020-09-09 RX ORDER — METFORMIN HYDROCHLORIDE 1000 MG/1
1000 TABLET, COATED ORAL
Refills: 0 | Status: ACTIVE | COMMUNITY

## 2020-09-15 ENCOUNTER — NON-APPOINTMENT (OUTPATIENT)
Age: 35
End: 2020-09-15

## 2020-09-15 ENCOUNTER — APPOINTMENT (OUTPATIENT)
Dept: OPHTHALMOLOGY | Facility: CLINIC | Age: 35
End: 2020-09-15
Payer: MEDICAID

## 2020-09-15 PROCEDURE — 92015 DETERMINE REFRACTIVE STATE: CPT

## 2020-09-15 PROCEDURE — 92004 COMPRE OPH EXAM NEW PT 1/>: CPT

## 2020-09-15 PROCEDURE — 92133 CPTRZD OPH DX IMG PST SGM ON: CPT

## 2020-10-05 ENCOUNTER — APPOINTMENT (OUTPATIENT)
Dept: NEUROLOGY | Facility: CLINIC | Age: 35
End: 2020-10-05
Payer: MEDICAID

## 2020-10-05 VITALS — BODY MASS INDEX: 40.3 KG/M2 | TEMPERATURE: 97.6 F | HEIGHT: 74 IN | WEIGHT: 314 LBS

## 2020-10-05 PROCEDURE — 99214 OFFICE O/P EST MOD 30 MIN: CPT

## 2020-10-27 ENCOUNTER — RX CHANGE (OUTPATIENT)
Age: 35
End: 2020-10-27

## 2020-10-29 ENCOUNTER — RX CHANGE (OUTPATIENT)
Age: 35
End: 2020-10-29

## 2020-11-19 ENCOUNTER — APPOINTMENT (OUTPATIENT)
Dept: NEUROLOGY | Facility: CLINIC | Age: 35
End: 2020-11-19
Payer: MEDICAID

## 2020-11-19 VITALS — HEIGHT: 74 IN | WEIGHT: 313 LBS | BODY MASS INDEX: 40.17 KG/M2 | TEMPERATURE: 97.3 F

## 2020-11-19 PROCEDURE — 99214 OFFICE O/P EST MOD 30 MIN: CPT

## 2021-02-22 ENCOUNTER — APPOINTMENT (OUTPATIENT)
Dept: NEUROLOGY | Facility: CLINIC | Age: 36
End: 2021-02-22
Payer: MEDICAID

## 2021-02-22 ENCOUNTER — APPOINTMENT (OUTPATIENT)
Dept: NEUROLOGY | Facility: CLINIC | Age: 36
End: 2021-02-22

## 2021-02-22 VITALS
HEIGHT: 74 IN | BODY MASS INDEX: 36.57 KG/M2 | SYSTOLIC BLOOD PRESSURE: 120 MMHG | DIASTOLIC BLOOD PRESSURE: 80 MMHG | TEMPERATURE: 98.3 F | WEIGHT: 285 LBS

## 2021-02-22 PROCEDURE — 99214 OFFICE O/P EST MOD 30 MIN: CPT

## 2021-02-22 RX ORDER — NORTRIPTYLINE HYDROCHLORIDE 25 MG/1
25 CAPSULE ORAL
Qty: 30 | Refills: 1 | Status: DISCONTINUED | COMMUNITY
Start: 2020-09-09 | End: 2021-02-22

## 2021-02-22 NOTE — HISTORY OF PRESENT ILLNESS
[FreeTextEntry1] : I saw him initially 9/9/20 with the following history. He reports right-sided headache going for about a month. He reports a dull ache. Will wax and wane, occasionally severe. There is no nausea, vomiting, visual disturbance. There is some photophobia. He does take 2 extra strength Tylenol every 6 hours most days, but not every day. He says it is affecting his sleep. He had a normal brain MRI. He denies prior significant headaches.\par \par Medical history significant diabetes, hypertension, schizoaffective disorder.\par \par I started him on nortriptyline 25 mg at bedtime I also explained to him that there was probably a significant component of analgesic overuse/rebound headache and instructed him to stop the almost daily Tylenol he had been using. He has been compliant with this. Initially there was no response. We increased the nortriptyline to 50 mg at bedtime and he says he has had a variable response. Headaches better at night but he still gets them during the day. Is not taking any over-the-counter medication.\par \par \par \par

## 2021-02-22 NOTE — ASSESSMENT
[FreeTextEntry1] : Headaches has feature of muscle contraction/tension-type headache. He has stopped the daily Tylenol. HHe will continue nortriptyline 50 mg at bedtime . Followup in 6 months.

## 2021-02-22 NOTE — PHYSICAL EXAM
[General Appearance - Alert] : alert [General Appearance - In No Acute Distress] : in no acute distress [Oriented To Time, Place, And Person] : oriented to person, place, and time [Impaired Insight] : insight and judgment were intact [Affect] : the affect was normal [Memory Recent] : recent memory was not impaired [Person] : oriented to person [Place] : oriented to place [Time] : oriented to time [Concentration Intact] : normal concentrating ability [Fluency] : fluency intact [Comprehension] : comprehension intact [Cranial Nerves Optic (II)] : visual acuity intact bilaterally,  visual fields full to confrontation, pupils equal round and reactive to light [Cranial Nerves Oculomotor (III)] : extraocular motion intact [Cranial Nerves Trigeminal (V)] : facial sensation intact symmetrically [Cranial Nerves Facial (VII)] : face symmetrical [Cranial Nerves Vestibulocochlear (VIII)] : hearing was intact bilaterally [Cranial Nerves Glossopharyngeal (IX)] : tongue and palate midline [Cranial Nerves Accessory (XI - Cranial And Spinal)] : head turning and shoulder shrug symmetric [Cranial Nerves Hypoglossal (XII)] : there was no tongue deviation with protrusion [Motor Tone] : muscle tone was normal in all four extremities [Motor Strength] : muscle strength was normal in all four extremities [No Muscle Atrophy] : normal bulk in all four extremities [Sensation Tactile Decrease] : light touch was intact [Sensation Pain / Temperature Decrease] : pain and temperature was intact [Abnormal Walk] : normal gait [Balance] : balance was intact [2+] : Ankle jerk left 2+ [PERRL With Normal Accommodation] : pupils were equal in size, round, reactive to light, with normal accommodation [Extraocular Movements] : extraocular movements were intact [Optic Disc Abnormality] : the optic disc were normal in size and color [Edema] : there was no peripheral edema [FreeTextEntry1] : Obese [Paresis Pronator Drift Right-Sided] : no pronator drift on the right [Paresis Pronator Drift Left-Sided] : no pronator drift on the left [Romberg's Sign] : Romberg's sign was negtive [Past-pointing] : there was no past-pointing [Tremor] : no tremor present [Coordination - Dysmetria Impaired Finger-to-Nose Bilateral] : not present [Coordination - Dysmetria Impaired Heel-to-Shin Bilateral] : not present [Plantar Reflex Right Only] : normal on the right [Plantar Reflex Left Only] : normal on the left

## 2021-03-14 ENCOUNTER — TRANSCRIPTION ENCOUNTER (OUTPATIENT)
Age: 36
End: 2021-03-14

## 2021-03-16 ENCOUNTER — APPOINTMENT (OUTPATIENT)
Dept: OPHTHALMOLOGY | Facility: CLINIC | Age: 36
End: 2021-03-16

## 2021-09-21 ENCOUNTER — APPOINTMENT (OUTPATIENT)
Dept: NEUROLOGY | Facility: CLINIC | Age: 36
End: 2021-09-21
Payer: MEDICAID

## 2021-09-21 VITALS
TEMPERATURE: 98.3 F | WEIGHT: 287 LBS | DIASTOLIC BLOOD PRESSURE: 80 MMHG | BODY MASS INDEX: 36.83 KG/M2 | SYSTOLIC BLOOD PRESSURE: 120 MMHG | HEIGHT: 74 IN

## 2021-09-21 PROCEDURE — 99214 OFFICE O/P EST MOD 30 MIN: CPT

## 2021-09-30 ENCOUNTER — APPOINTMENT (OUTPATIENT)
Dept: OPHTHALMOLOGY | Facility: CLINIC | Age: 36
End: 2021-09-30
Payer: MEDICAID

## 2021-09-30 ENCOUNTER — NON-APPOINTMENT (OUTPATIENT)
Age: 36
End: 2021-09-30

## 2021-09-30 PROCEDURE — 92015 DETERMINE REFRACTIVE STATE: CPT | Mod: NC

## 2021-09-30 PROCEDURE — 92083 EXTENDED VISUAL FIELD XM: CPT

## 2021-09-30 PROCEDURE — 92012 INTRM OPH EXAM EST PATIENT: CPT

## 2022-01-01 NOTE — PATIENT PROFILE BEHAVIORAL HEALTH - PATIENT REPRESENTATIVE PHONE
DOCUMENT CREATED: 2022  1413h  NAME: Se Cook (Girl)  CLINIC NUMBER: [id number]  ADMITTED: 2022  HOSPITAL NUMBER: [id number]  BIRTH WEIGHT: 0.860 kg (26.8 percentile)  GESTATIONAL AGE AT BIRTH: 27 1 days  DATE OF SERVICE: 2022     AGE: 11 days. POSTMENSTRUAL AGE: 28 weeks 5 days. CURRENT WEIGHT: 0.870 kg (Up   30gm) (1 lb 15 oz) (15.4 percentile). WEIGHT GAIN: 11 gm/kg/day in the past   week.        VITAL SIGNS & PHYSICAL EXAM  WEIGHT: 0.870kg (15.4 percentile)  OVERALL STATUS: Critical - stable. BED: Isolette. TEMP: 97.8-99.2. HR: 129-181.   RR: 31-86. BP: 51/21-55/23 (MAP 30-33)  URINE OUTPUT: 3.6 ml/kg/hr. STOOL: X1.  HEENT: Anterior fontanelle open soft and flat/full, ears normally placed, nares   patent, MERY cannula in place, moist mucous membranes, OG in place secured to   chin.  RESPIRATORY: Breathing comfortably on vapotherm without retractions on 22% FiO2.   Breath sounds clear and equal bilaterally.  CARDIAC: Normal rate and rhythm. Grade III/VI murmur. Cap refill 2-3 seconds.  ABDOMEN: Distended with visible loops, but very soft, non-tender,   non-discolored. Normoactive bowel sounds present. UVC in place.  : Normal  female external genitalia.  NEUROLOGIC: Active. Normal tone and movement for gestational age. Appropriately   responsive to exam.  EXTREMITIES: Moves all extremities spontaneously.  SKIN: Pink, warm, well perfused. ID band in place.     NEW FLUID INTAKE  Based on 0.870kg. All IV constituents in mEq/kg unless otherwise specified.  TPN-UVC: D7 AA:2.3 gm/kg NaCl:2 KAcet:1 KPhos:1.1 Ca:32 mg/kg M.2  FEEDS: Human Milk -  20 kcal/oz 3.7ml OG q1h  for 12h  FEEDS: Human Milk -  20 kcal/oz 4ml OG q1h  for 12h  INTAKE OVER PAST 24 HOURS: 147ml/kg/d. OUTPUT OVER PAST 24 HOURS: 3.6ml/kg/hr.   TOLERATING FEEDS: Fairly well. COMMENTS: Tolerating advancing enteral feeds.   Remains on custom TPN for remainder of nutrition. Gained 30g overnight, now   above  birth weight. PLANS: Will continue advancing enteral feeds. Titrate down   custom TPN.     CURRENT MEDICATIONS  Caffeine citrated 10 mg/kg/dose IV q24hr started on 2022 (completed 2 days)     RESPIRATORY SUPPORT  SUPPORT: Nasal ventilation (NIPPV) since 2022  FiO2: 0.21-0.23  PEEP: 6 cmH2O  PIP: 20 cmH2O  RATE: 30  CBG 2022  04:22h: pH:7.37  pCO2:50  pO2:38  Bicarb:28.4  APNEA SPELLS: 8 in the last 24 hours.     CURRENT PROBLEMS & DIAGNOSES  PREMATURITY - LESS THAN 28 WEEKS  ONSET: 2022  STATUS: Active  COMMENTS: 11 days old, now 28 5/7 weeks corrected gestational age. Euthermic in   isolette. Gained weight overnight, now above birth weight. Tolerating advancing   enteral feeds.  PLANS: Continue to provide developmental supportive care as tolerated. Continue   feeding advancement.  RESPIRATORY DISTRESS SYNDROME  ONSET: 2022  STATUS: Active  COMMENTS: Remains on low NIPPV support with minimal supplemental FiO2   requirement in the past 24 hours. CBG good this AM.  PLANS: Continue blood gases q24hr. May be able to transition to bubble or   Vapotherm soon.  IVH GRADE IV  ONSET: 2022  STATUS: Active  PROCEDURES: Cranial ultrasound on 2022 (Grade 2 germinal matrix hemorrhage   on the right and grade 1 germinal matrix hemorrhage on the left.); MRI scan on   2022 (Bilateral germinal matrix, intraventricular and intraparenchymal   hemorrhages with associated ventriculomegaly.); Cranial ultrasound on 2022   (Bilateral Grade IV IVH with slight increase in ventriculomegaly.).  COMMENTS: CUS on DOL 2 (1/12) due to decreased hematocrit, showed Grade 2 IVH on   right and grade 1 on the left. Follow-up CUS 1/18 with progression to Grade   III/IV. Pediatric neurosurgery consulted. Following daily head circumference.   Head circumference this morning 24cm, stable. Brain MRI 1/19 with bilateral   grade IV IVH with ventriculomegaly.  PLANS: Continue daily head circumferences. Follow with  pediatric neurosurgery.   Will notify of increased apnea/bradycardia events or increased head   circumference. Repeat CUS on Monday.  APNEA & BRADYCARDIA  ONSET: 2022  STATUS: Active  COMMENTS: 8 apnea/bradycardia events in the past 24hr. On caffeine therapy.  PLANS: Continue caffeine and follow clinically. Transition to enteral caffeine   tomorrow if continues increased volume of enteral feeds.  ANEMIA  ONSET: 2022  STATUS: Active  COMMENTS: Last transfused pRBCs on 1/10.  hematocrit stable at 39.2%.  PLANS: Repeat heme labs on Monday.  PATENT DUCTUS ARTERIOSUS  ONSET: 2022  STATUS: Active  PROCEDURES: Echocardiogram on 2022 (There is a large (3 mm) PDA with left   to right shunting. Normal LV structure and size. Normal LV systolic function.   Qualitatively RV is mildly hypertrophied with normal systolic function. RV   systolic pressure estimate moderately increased.).  COMMENTS: Echo  with large (3mm) PDA with left to right shunting. Moderately   elevated RV pressure.  PLANS: Follow-up echocardiogram in 1 week.  VASCULAR ACCESS  ONSET: 2022  STATUS: Active  PROCEDURES: UVC placement on 2022 (secured at 6.5 cm ).  COMMENTS: UVC required for administration of medications and parenteral   nutrition, catheter tip between T10-T11 on last x-ray.  PLANS: Maintain line per unit protocol. Obtain PICC consent and attempt PICC   when able.     TRACKING   SCREENING: Last study on 2022: Pending.  FURTHER SCREENING: Car seat screen indicated, hearing screen indicated,    screen indicated at 28 DOL and ROP screen indicated.  SOCIAL COMMENTS: - Mother updated over the phone regarding CUS results and   need for neurosurgery consult (AE).     NOTE CREATORS  DAILY ATTENDING: Komal Dubose DO  PREPARED BY: Komal Dubose DO                 Electronically Signed by Komal Dubose DO on 2022 1413.            [phone]

## 2022-03-21 ENCOUNTER — APPOINTMENT (OUTPATIENT)
Dept: NEUROLOGY | Facility: CLINIC | Age: 37
End: 2022-03-21
Payer: MEDICAID

## 2022-03-31 ENCOUNTER — APPOINTMENT (OUTPATIENT)
Dept: OPHTHALMOLOGY | Facility: CLINIC | Age: 37
End: 2022-03-31

## 2022-05-09 ENCOUNTER — RX RENEWAL (OUTPATIENT)
Age: 37
End: 2022-05-09

## 2022-05-24 ENCOUNTER — APPOINTMENT (OUTPATIENT)
Dept: NEUROLOGY | Facility: CLINIC | Age: 37
End: 2022-05-24
Payer: MEDICAID

## 2022-05-24 ENCOUNTER — NON-APPOINTMENT (OUTPATIENT)
Age: 37
End: 2022-05-24

## 2022-06-28 ENCOUNTER — APPOINTMENT (OUTPATIENT)
Dept: NEUROLOGY | Facility: CLINIC | Age: 37
End: 2022-06-28
Payer: MEDICAID

## 2022-06-28 ENCOUNTER — NON-APPOINTMENT (OUTPATIENT)
Age: 37
End: 2022-06-28

## 2022-06-28 VITALS
DIASTOLIC BLOOD PRESSURE: 80 MMHG | WEIGHT: 295 LBS | SYSTOLIC BLOOD PRESSURE: 122 MMHG | BODY MASS INDEX: 37.86 KG/M2 | HEIGHT: 74 IN

## 2022-06-28 DIAGNOSIS — G44.221 CHRONIC TENSION-TYPE HEADACHE, INTRACTABLE: ICD-10-CM

## 2022-06-28 DIAGNOSIS — F19.239 OTHER PSYCHOACTIVE SUBSTANCE DEPENDENCE WITH WITHDRAWAL, UNSPECIFIED: ICD-10-CM

## 2022-06-28 PROCEDURE — 99214 OFFICE O/P EST MOD 30 MIN: CPT

## 2022-06-28 NOTE — HISTORY OF PRESENT ILLNESS
[FreeTextEntry1] : I saw him initially 9/9/20 with the following history. He reports right-sided headache going for about a month. He reports a dull ache. Will wax and wane, occasionally severe. There is no nausea, vomiting, visual disturbance. There is some photophobia. He does take 2 extra strength Tylenol every 6 hours most days, but not every day. He says it is affecting his sleep. He had a normal brain MRI. He denies prior significant headaches.\par \par Medical history significant diabetes, hypertension, schizoaffective disorder.\par \par I started him on nortriptyline 25 mg at bedtime I also explained to him that there was probably a significant component of analgesic overuse/rebound headache and instructed him to stop the almost daily Tylenol he had been using. He has been compliant with this. Initially there was no response. We increased the nortriptyline to 50 mg at bedtime.  It was working well for a while.  He now says, however, that he gets a bad headache every other day and is taking Tylenol for that.\par \par \par

## 2022-06-28 NOTE — PHYSICAL EXAM
[General Appearance - Alert] : alert [General Appearance - In No Acute Distress] : in no acute distress [FreeTextEntry1] : Obese [Oriented To Time, Place, And Person] : oriented to person, place, and time [Impaired Insight] : insight and judgment were intact [Affect] : the affect was normal [Memory Recent] : recent memory was not impaired [Person] : oriented to person [Place] : oriented to place [Time] : oriented to time [Concentration Intact] : normal concentrating ability [Fluency] : fluency intact [Comprehension] : comprehension intact [Cranial Nerves Optic (II)] : visual acuity intact bilaterally,  visual fields full to confrontation, pupils equal round and reactive to light [Cranial Nerves Oculomotor (III)] : extraocular motion intact [Cranial Nerves Trigeminal (V)] : facial sensation intact symmetrically [Cranial Nerves Facial (VII)] : face symmetrical [Cranial Nerves Vestibulocochlear (VIII)] : hearing was intact bilaterally [Cranial Nerves Glossopharyngeal (IX)] : tongue and palate midline [Cranial Nerves Accessory (XI - Cranial And Spinal)] : head turning and shoulder shrug symmetric [Cranial Nerves Hypoglossal (XII)] : there was no tongue deviation with protrusion [Motor Tone] : muscle tone was normal in all four extremities [Motor Strength] : muscle strength was normal in all four extremities [No Muscle Atrophy] : normal bulk in all four extremities [Paresis Pronator Drift Right-Sided] : no pronator drift on the right [Paresis Pronator Drift Left-Sided] : no pronator drift on the left [Sensation Tactile Decrease] : light touch was intact [Sensation Pain / Temperature Decrease] : pain and temperature was intact [Romberg's Sign] : Romberg's sign was negtive [Abnormal Walk] : normal gait [Balance] : balance was intact [Past-pointing] : there was no past-pointing [Tremor] : no tremor present [Coordination - Dysmetria Impaired Finger-to-Nose Bilateral] : not present [Coordination - Dysmetria Impaired Heel-to-Shin Bilateral] : not present [2+] : Ankle jerk left 2+ [Plantar Reflex Right Only] : normal on the right [Plantar Reflex Left Only] : normal on the left [PERRL With Normal Accommodation] : pupils were equal in size, round, reactive to light, with normal accommodation [Extraocular Movements] : extraocular movements were intact [Optic Disc Abnormality] : the optic disc were normal in size and color [Edema] : there was no peripheral edema

## 2022-06-28 NOTE — ASSESSMENT
[FreeTextEntry1] : Headaches have feature of muscle contraction/tension-type headache. \par We will increase the nortriptyline to 75 mg at bedtime due to worsening of the headaches\par I have again cautioned him about overuse Tylenol leading to withdrawal headaches and he understands this\par Follow-up in 6 months and by phone prior to that if needed.

## 2022-07-20 ENCOUNTER — RX CHANGE (OUTPATIENT)
Age: 37
End: 2022-07-20

## 2022-07-20 RX ORDER — NORTRIPTYLINE HYDROCHLORIDE 75 MG/1
75 CAPSULE ORAL
Qty: 30 | Refills: 6 | Status: DISCONTINUED | COMMUNITY
Start: 2022-06-28 | End: 2022-07-20

## 2022-08-05 ENCOUNTER — RX RENEWAL (OUTPATIENT)
Age: 37
End: 2022-08-05

## 2022-08-30 ENCOUNTER — APPOINTMENT (OUTPATIENT)
Dept: OPHTHALMOLOGY | Facility: CLINIC | Age: 37
End: 2022-08-30

## 2022-09-07 ENCOUNTER — APPOINTMENT (OUTPATIENT)
Dept: OPHTHALMOLOGY | Facility: CLINIC | Age: 37
End: 2022-09-07

## 2022-09-29 ENCOUNTER — APPOINTMENT (OUTPATIENT)
Dept: OPHTHALMOLOGY | Facility: CLINIC | Age: 37
End: 2022-09-29

## 2022-10-05 ENCOUNTER — APPOINTMENT (OUTPATIENT)
Dept: OPHTHALMOLOGY | Facility: CLINIC | Age: 37
End: 2022-10-05

## 2022-10-05 ENCOUNTER — NON-APPOINTMENT (OUTPATIENT)
Age: 37
End: 2022-10-05

## 2022-10-05 PROCEDURE — 92014 COMPRE OPH EXAM EST PT 1/>: CPT

## 2022-10-05 PROCEDURE — 92133 CPTRZD OPH DX IMG PST SGM ON: CPT

## 2022-10-25 NOTE — ED BEHAVIORAL HEALTH ASSESSMENT NOTE - RELATEDNESS
Good High Dose Vitamin A Pregnancy And Lactation Text: High dose vitamin A therapy is contraindicated during pregnancy and breast feeding.

## 2022-10-31 ENCOUNTER — RX RENEWAL (OUTPATIENT)
Age: 37
End: 2022-10-31

## 2022-11-16 ENCOUNTER — APPOINTMENT (OUTPATIENT)
Dept: OPHTHALMOLOGY | Facility: CLINIC | Age: 37
End: 2022-11-16

## 2022-11-17 ENCOUNTER — APPOINTMENT (OUTPATIENT)
Dept: OPHTHALMOLOGY | Facility: CLINIC | Age: 37
End: 2022-11-17

## 2022-12-09 ENCOUNTER — NON-APPOINTMENT (OUTPATIENT)
Age: 37
End: 2022-12-09

## 2022-12-09 ENCOUNTER — APPOINTMENT (OUTPATIENT)
Dept: OPHTHALMOLOGY | Facility: CLINIC | Age: 37
End: 2022-12-09

## 2022-12-09 PROCEDURE — 92015 DETERMINE REFRACTIVE STATE: CPT | Mod: NC

## 2023-01-23 ENCOUNTER — RX RENEWAL (OUTPATIENT)
Age: 38
End: 2023-01-23

## 2023-01-23 RX ORDER — NORTRIPTYLINE HYDROCHLORIDE 50 MG/1
50 CAPSULE ORAL
Qty: 90 | Refills: 0 | Status: ACTIVE | COMMUNITY
Start: 2020-10-05 | End: 1900-01-01

## 2023-03-10 ENCOUNTER — OFFICE VISIT (OUTPATIENT)
Dept: BEHAVIORAL HEALTH | Facility: CLINIC | Age: 38
End: 2023-03-10
Payer: COMMERCIAL

## 2023-03-10 ENCOUNTER — HOSPITAL ENCOUNTER (EMERGENCY)
Facility: CLINIC | Age: 38
End: 2023-03-10
Payer: MEDICAID

## 2023-03-10 ENCOUNTER — TELEPHONE (OUTPATIENT)
Dept: BEHAVIORAL HEALTH | Facility: CLINIC | Age: 38
End: 2023-03-10

## 2023-03-10 VITALS
DIASTOLIC BLOOD PRESSURE: 79 MMHG | WEIGHT: 227.8 LBS | HEART RATE: 72 BPM | SYSTOLIC BLOOD PRESSURE: 120 MMHG | BODY MASS INDEX: 29.25 KG/M2 | OXYGEN SATURATION: 96 %

## 2023-03-10 DIAGNOSIS — F17.200 TOBACCO USE DISORDER: ICD-10-CM

## 2023-03-10 DIAGNOSIS — F32.A DEPRESSION, UNSPECIFIED DEPRESSION TYPE: ICD-10-CM

## 2023-03-10 DIAGNOSIS — F11.93 OPIOID WITHDRAWAL (H): ICD-10-CM

## 2023-03-10 DIAGNOSIS — F43.10 PTSD (POST-TRAUMATIC STRESS DISORDER): ICD-10-CM

## 2023-03-10 DIAGNOSIS — F11.20 OPIOID USE DISORDER, SEVERE, DEPENDENCE (H): Primary | ICD-10-CM

## 2023-03-10 DIAGNOSIS — F15.90 STIMULANT USE DISORDER: ICD-10-CM

## 2023-03-10 LAB
AMPHETAMINE QUAL URINE POCT: ABNORMAL
BARBITURATE QUAL URINE POCT: NEGATIVE
BENZODIAZEPINE QUAL URINE POCT: NEGATIVE
BUPRENORPHINE QUAL URINE POCT: NEGATIVE
COCAINE QUAL URINE POCT: NEGATIVE
CREATININE QUAL URINE POCT: ABNORMAL
FENTANYL UR QL: ABNORMAL
INTERNAL QC QUAL URINE POCT: ABNORMAL
MDMA QUAL URINE POCT: ABNORMAL
METHADONE QUAL URINE POCT: NEGATIVE
METHAMPHETAMINE QUAL URINE POCT: ABNORMAL
OPIATE QUAL URINE POCT: NEGATIVE
OXYCODONE QUAL URINE POCT: NEGATIVE
PH QUAL URINE POCT: ABNORMAL
PHENCYCLIDINE QUAL URINE POCT: NEGATIVE
POCT KIT EXPIRATION DATE: ABNORMAL
POCT KIT LOT NUMBER: ABNORMAL
SPECIFIC GRAVITY POCT: 1.01
TEMPERATURE URINE POCT: ABNORMAL
THC QUAL URINE POCT: NEGATIVE

## 2023-03-10 PROCEDURE — 99205 OFFICE O/P NEW HI 60 MIN: CPT | Performed by: FAMILY MEDICINE

## 2023-03-10 PROCEDURE — 99000 SPECIMEN HANDLING OFFICE-LAB: CPT | Performed by: FAMILY MEDICINE

## 2023-03-10 PROCEDURE — 80307 DRUG TEST PRSMV CHEM ANLYZR: CPT | Performed by: FAMILY MEDICINE

## 2023-03-10 PROCEDURE — 99207 PR NO BILLABLE SERVICE THIS VISIT: CPT

## 2023-03-10 PROCEDURE — 80354 DRUG SCREENING FENTANYL: CPT | Mod: 90 | Performed by: FAMILY MEDICINE

## 2023-03-10 RX ORDER — ONDANSETRON 4 MG/1
4-8 TABLET, ORALLY DISINTEGRATING ORAL EVERY 8 HOURS PRN
Qty: 30 TABLET | Refills: 0 | Status: SHIPPED | OUTPATIENT
Start: 2023-03-10

## 2023-03-10 RX ORDER — BUPRENORPHINE AND NALOXONE 12; 3 MG/1; MG/1
1 FILM, SOLUBLE BUCCAL; SUBLINGUAL 2 TIMES DAILY
Qty: 20 FILM | Refills: 0 | Status: SHIPPED | OUTPATIENT
Start: 2023-03-10 | End: 2023-03-12

## 2023-03-10 RX ORDER — DIPHENHYDRAMINE HYDROCHLORIDE 50 MG/ML
50 INJECTION INTRAMUSCULAR; INTRAVENOUS
Status: CANCELLED
Start: 2023-03-10

## 2023-03-10 RX ORDER — ALBUTEROL SULFATE 90 UG/1
1-2 AEROSOL, METERED RESPIRATORY (INHALATION)
Status: CANCELLED
Start: 2023-03-10

## 2023-03-10 RX ORDER — EPINEPHRINE 1 MG/ML
0.3 INJECTION, SOLUTION, CONCENTRATE INTRAVENOUS EVERY 5 MIN PRN
Status: CANCELLED | OUTPATIENT
Start: 2023-03-10

## 2023-03-10 RX ORDER — BUPRENORPHINE AND NALOXONE 12; 3 MG/1; MG/1
1 FILM, SOLUBLE BUCCAL; SUBLINGUAL 2 TIMES DAILY
Qty: 20 FILM | Refills: 0 | Status: SHIPPED | OUTPATIENT
Start: 2023-03-10 | End: 2023-03-10

## 2023-03-10 RX ORDER — MEPERIDINE HYDROCHLORIDE 25 MG/ML
25 INJECTION INTRAMUSCULAR; INTRAVENOUS; SUBCUTANEOUS EVERY 30 MIN PRN
Status: CANCELLED | OUTPATIENT
Start: 2023-03-10

## 2023-03-10 RX ORDER — LORAZEPAM 1 MG/1
1-2 TABLET ORAL
Qty: 2 TABLET | Refills: 0 | Status: SHIPPED | OUTPATIENT
Start: 2023-03-10

## 2023-03-10 RX ORDER — CLONIDINE HYDROCHLORIDE 0.1 MG/1
.1-.2 TABLET ORAL EVERY 8 HOURS PRN
Qty: 30 TABLET | Refills: 0 | Status: SHIPPED | OUTPATIENT
Start: 2023-03-10

## 2023-03-10 RX ORDER — ALBUTEROL SULFATE 0.83 MG/ML
2.5 SOLUTION RESPIRATORY (INHALATION)
Status: CANCELLED | OUTPATIENT
Start: 2023-03-10

## 2023-03-10 RX ORDER — GABAPENTIN 300 MG/1
300-600 CAPSULE ORAL EVERY 8 HOURS PRN
Qty: 30 CAPSULE | Refills: 0 | Status: SHIPPED | OUTPATIENT
Start: 2023-03-10

## 2023-03-10 RX ORDER — LIDOCAINE HYDROCHLORIDE 10 MG/ML
2 INJECTION, SOLUTION EPIDURAL; INFILTRATION; INTRACAUDAL; PERINEURAL ONCE
Status: CANCELLED | OUTPATIENT
Start: 2023-03-10 | End: 2023-03-10

## 2023-03-10 RX ORDER — METHYLPREDNISOLONE SODIUM SUCCINATE 125 MG/2ML
125 INJECTION, POWDER, LYOPHILIZED, FOR SOLUTION INTRAMUSCULAR; INTRAVENOUS
Status: CANCELLED
Start: 2023-03-10

## 2023-03-10 RX ORDER — TRAZODONE HYDROCHLORIDE 50 MG/1
50-100 TABLET, FILM COATED ORAL
Qty: 60 TABLET | Refills: 0 | Status: SHIPPED | OUTPATIENT
Start: 2023-03-10

## 2023-03-10 ASSESSMENT — PATIENT HEALTH QUESTIONNAIRE - PHQ9: SUM OF ALL RESPONSES TO PHQ QUESTIONS 1-9: 17

## 2023-03-10 NOTE — PROGRESS NOTES
[facility] Initial Visit    ASSESSMENT/PLAN                                                      1. Opioid use disorder, severe, dependence (H)  37 yo male with ~3 yr h/o IN fentanyl use, last use 3/9/23 ~2200.  Interested in starting buprenorphine.    Reviewed directions for starting buprenorphine when at least 24 hrs from last use.  Stressed importance of using other medications for withdrawal until able to start buprenorphine.   Using high dose start, up to 24mg day one, continue 16mg/day maintenance.   Pt is interested in eventual transfer to [facility].  Requested insurance approval.  Discussed approval process may require UDS +buprenorphine which will be possible when pt returns in 1 week.   Pt declined baseline labs/ID screening today, agreed to future visit.   Proceed with plans to start programming with [facility]  - Drugs of Abuse Screen Urine (POC CUPS) POCT; Standing  - Drugs of Abuse Screen Urine (POC CUPS) POCT  - naloxone (NARCAN) 4 MG/0.1ML nasal spray; Spray 1 spray (4 mg) into one nostril alternating nostrils once as needed for opioid reversal every 2-3 minutes until assistance arrives  Dispense: 0.2 mL; Refill: 11  - Adult Mental Health  Referral; Future  - Fentanyl Qualitative with Reflex to Quant Urine; Future  - Fentanyl Qualitative with Reflex to Quant Urine  - Buprenorphine HCl-Naloxone HCl (SUBOXONE) 8mg/2mg  FILM per film; Place 1 Film under the tongue 2 times daily  Dispense: 20 Film; Refill: 0  - Fentanyl and Metabolite Quantitative, Urine    2. Opioid withdrawal (H)  Reviewed in detail use of medications below to address withdrawal symptoms until able to start buprenorphine and stabilized on buprenorphine.   Stressed importance of avoiding concurrent use of lorazepam with full opioid agonist.   - cloNIDine (CATAPRES) 0.1 MG tablet; Take 1-2 tablets (0.1-0.2 mg) by mouth every 8 hours as needed (withdrawal symptoms including hot/cold  sweats, anxiety, restlessness, sleeplessness)  Dispense: 30 tablet; Refill: 0  - gabapentin (NEURONTIN) 300 MG capsule; Take 1-2 capsules (300-600 mg) by mouth every 8 hours as needed (withdrawal symptoms including anxiety, restlessness, sleeplessness)  Dispense: 30 capsule; Refill: 0  - ondansetron (ZOFRAN ODT) 4 MG ODT tab; Take 1-2 tablets (4-8 mg) by mouth every 8 hours as needed for nausea  Dispense: 30 tablet; Refill: 0  - LORazepam (ATIVAN) 1 MG tablet; Take 1-2 tablets (1-2 mg) by mouth at bedtime as needed, may repeat once for sleep or withdrawal  Dispense: 2 tablet; Refill: 0  - traZODone (DESYREL) 50 MG tablet; Take 1-2 tablets ( mg) by mouth nightly as needed for sleep  Dispense: 60 tablet; Refill: 0    3. Stimulant use disorder  Proceed with plans to start programming with [facility]  Consider bupropion at follow-up visits    4. Tobacco use disorder  Evaluate pt's goals future visit    5. Depression, unspecified depression type  Referral to psychiatry and for therapy, follow-up on status  - Adult Mental Health  Referral; Future  - Adult Mental Health  Referral; Future    6. PTSD (post-traumatic stress disorder)  Referral to psychiatry and for therapy, follow-up on status  - Adult Mental Health  Referral; Future  - Adult Mental Health  Referral; Future     Return in about 1 week (around 3/17/2023) for Follow up, in person.       Patient counseling completed today:  Discussed mechanism of action, potential risks/benefits/side effects of medications and other recommendations above.    Discussed risk of precipitated withdrawal with initiation of buprenorphine in the presence of full opioid agonists.    Reviewed directions for initiation of buprenorphine to reduce risk of precipitated withdrawal and maximize efficacy.    Harm reduction counseling including never use alone, availability of naloxone, avoiding combination of opioids with  benzodiazepines, alcohol, or other sedatives, safer administration.      Discussed importance of avoiding isolation, building a network of supportive relationships, avoiding people/places/things associated with past use to reduce risk of relapse; including motivational interviewing regarding psychosocial treatment for addiction.     SUBJECTIVE                                                      CC/HPI:  Rosalio Arreola is a 38 year old male with PMH PTSD, depression, AH/VH in the setting of methamphetamine use, tobacco use disorder, stimulant use disorder, and opioid use disorder who presents to the [facility] for initial visit.      Brief History:  Rosalio Arreola was first seen in Recovery Clinic on 03/10/23. They were referred by [facility].   Patient's reasons for seeking treatment on this date include wanting to start buprenorphine to help him stop use of fentanyl.     Substance Use History :  Opioids:   Age at first use: 35. Was up to 20 unregulated opioid pills per day.  Relapsed early 3/2023 after 2 months of sobriety using 3-4 pills/day.     Current use: substance: fentanyl; quantity 3-4 per day; route: insufflation ; timing of last use: 3/9/23 ~2200     IV drug use: No   History of overdose: No  Previous residential or outpatient treatments for addiction : Yes: [facility], and currently [facility]  Previous medication treatments for addiction: No  Longest period of sobriety: 8 months  Medical complications related to substance use: None  Hepatitis C: Date of most recent testing: no record, agreed to testing future visit, declined 3/10/23  HIV:  Date of most recent testing: no record, agreed to testing future visit, declined 3/10/23    Taking buprenorphine? No     Narcan currently available: Yes    DSM-5 OUD criteria met:  Taken in larger amounts/greater time spent in behavior over longer period of time than intended  Persistent desire or unsuccessful efforts to  cut down or control use/behavior  A great deal of time is spent in activities necessary to obtain the substance/participate in the behavior or recover from its effects   Cravings  Recurrent use/behavior resulting in failure to fulfill major role obligations at work, school, or home  Continued use/behavior despite having persistent or recurrent social or interpersonal problems caused or exacerbated by effects of use/behavior   Important social, occupational, or recreational activities are given up or reduced because of use/behavior  Tolerance   Withdrawal    Other Addiction History:  Stimulants   Methamphetamines. Uses on the weekends. Last used 3/9/23  Sedatives/hypnotics/anxiolytics:   Xanax. Last used 2/2022  Alcohol:   None  Nicotine:   Smokes a pack or less per day  Cannabis:   None  Hallucinogens/Dissociatives:   Tried mushrooms  Eating disorder:  None  Gambling:   None        [place] Prescription Drug Monitoring Program Reviewed:  Yes; as expected        No past medical history on file.      PAST PSYCHIATRIC HISTORY:  Diagnoses- AH/VH hallucinations when using methamphetamine in the past, depression, and PTSD  Suicide Attempts: No   Hospitalizations: Yes 2019 once     PHQ 3/10/2023   PHQ-9 Total Score 17   Q9: Thoughts of better off dead/self-harm past 2 weeks Not at all         If PHQ-9 score of 15 or higher, has [facility] therapist or provider been notified? Yes    Any current suicidal ideation? No  If yes, has [facility] therapist or provider been notified? N/A    Mental health provider: None - would like a referral - referral placed 3/10/23    No past surgical history on file.    Medications:  No current outpatient medications on file prior to visit.  No current facility-administered medications on file prior to visit.      Allergies   Allergen Reactions     Acetylcysteine Itching     Rash        No family history on file.      Social History  Housing status: [facility] housing  Employment status:  Unemployed, seeking work  Relationship status: Single  Children: no children  Legal: None  Insurance needs: Active  Contact information up to date? Yes    3rd Party Involvement: [facility] treatment center     REVIEW OF SYSTEMS:  General: Withdrawal symptoms as described below.  No recent fevers.   Interested in working with mental health professionals regarding mood disorder, PTSD    OBJECTIVE                                                        Clinical Opioid Withdrawal Scale (COWS)    Resting Pulse Rate  0  =  <=80    Sweating    (over past 1/2 hour) 1  =  subjective report of chills or flushing   Restlessness  0  =  able to sit still   Pupil size  0  =  pupils pinned or normal size for room light   Bone or Joint Aches    (acute only) 1  =  mild diffuse discomfort   Runny nose or tearing    (unrelated to cold/allergies) 1  =  nasal stuffiness or unusually moist eyes   GI Upset    (over past 1/2 hour) 1  =  stomach cramps   Tremor    (outstretched hands) 0  =  no tremor   Yawning    (during assessment) 0  =  no yawning   Anxiety/Irritability 1  =  patient reports increasing irritability or anxiousness   Gooseflesh skin 0  =  skin is smooth     TOTAL SCORE  Add column for score   5       /79   Pulse 72   Wt 103.3 kg (227 lb 12.8 oz)   SpO2 96%   BMI 29.25 kg/m      Physical Exam  Constitutional:       Appearance: Normal appearance.   HENT:      Nose: Congestion present. No rhinorrhea.   Eyes:      General: No scleral icterus.     Extraocular Movements: Extraocular movements intact.      Conjunctiva/sclera: Conjunctivae normal.   Pulmonary:      Effort: Pulmonary effort is normal.   Neurological:      Mental Status: He is alert and oriented to person, place, and time.      Coordination: Coordination is intact.      Gait: Gait is intact.   Psychiatric:         Attention and Perception: Attention and perception normal.         Mood and Affect: Mood is anxious and depressed. Affect is not  inappropriate.         Speech: Speech normal.         Behavior: Behavior is cooperative.         Thought Content: Thought content does not include suicidal ideation.      Comments: Insight and judgement are fair to good         Labs:    UDS:   Lab Results   Component Value Date    BUP Negative 03/10/2023    BZO Negative 03/10/2023    BAR Negative 03/10/2023    EDGAR Negative 03/10/2023    MAMP Screen Positive (A) 03/10/2023    AMP Screen Positive (A) 03/10/2023    MDMA Screen Positive (A) 03/10/2023    MTD Negative 03/10/2023    PAG904 Negative 03/10/2023    OXY Negative 03/10/2023    PCP Negative 03/10/2023    THC Negative 03/10/2023    TEMP Invalid (A) 03/10/2023    SGPOCT 1.015 03/10/2023       *POC urine drug screen does not screen for Fentanyl    Recent Results (from the past 720 hour(s))   Drugs of Abuse Screen Urine (POC CUPS) POCT    Collection Time: 03/10/23  3:40 PM   Result Value Ref Range    POCT Kit Lot Number [id number]     POCT Kit Expiration Date 2024-08-18     Temperature Urine POCT Invalid (A) 90 F, 92 F, 94 F, 96 F, 98 F, 100 F    Specific [place] POCT 1.015 1.005, 1.015, 1.025    pH Qual Urine POCT 7 pH 4 pH, 5 pH, 7 pH, 9 pH    Creatinine Qual Urine POCT 50 mg/dL 20 mg/dL, 50 mg/dL, 100 mg/dL, 200 mg/dL    Internal QC Qual Urine POCT Valid Valid    Amphetamine Qual Urine POCT Screen Positive (A) Negative    Barbiturate Qual Urine POCT Negative Negative    Buprenorphine Qual Urine POCT Negative Negative    Benzodiazepine Qual Urine POCT Negative Negative    Cocaine Qual Urine POCT Negative Negative    Methamphetamine Qual Urine POCT Screen Positive (A) Negative    MDMA Qual Urine POCT Screen Positive (A) Negative    Methadone Qual Urine POCT Negative Negative    Opiate Qual Urine POCT Negative Negative    Oxycodone Qual Urine POCT Negative Negative    Phencyclidine Qual Urine POCT Negative Negative    THC Qual Urine POCT Negative Negative   Fentanyl Qualitative with Reflex to Quant Urine     Collection Time: 03/10/23  4:38 PM   Result Value Ref Range    Fentanyl Qual Urine Screen Positive (A) Screen Negative         At least 60 min spent in review of medical record,  review, obtaining histories, discussing recommendations, counseling, providing support.      Isa Davis MD  Addiction Medicine  [place] [id number] [address] [address]  [phone]

## 2023-03-10 NOTE — TELEPHONE ENCOUNTER
Writer spoke with [facility]'s pharmacy who reports patients insurance will only cover Suboxone 12-3 mg 0.5 sl film BID and not 1 sl film BID, writer requested the pharmacy run the prescription for Suboxone 8-2 mg films BID as per provider request in the notes to pharmacy section. Pharamcy reported patients insurance did cover Suboxone 8-2 mg sl film BID. Writer attempted to botify patient, no answer and no VM box set up.     Luisa Marcelo RN on 3/10/2023 at 5:37 PM

## 2023-03-10 NOTE — PROGRESS NOTES
"[facility]    Peer  met with Rosalio TEVIN Maxwell in the Recovery Clinic to introduce himself, detail services provided and discuss current status of recovery. Pt appeared alert, oriented and open to feedback during our discussion.     Pt arrives for visit with provider for suboxone.  Pt reports at [facility]. Pt reports has been struggling with his addiction for a couple of months. He shared he needs to change his Samish of friends. Writer stated, \"Changing your mind set is wonnderful\"   PRS encouraged establishing firm and healthy boundaries with ppl, places and things as an important element to the recovery process.     PRC provided business card to pt welcoming contact for recovery based support and resources. PRC and pt agree to speak again during an upcoming  visit.           Service Type:     Individual     Session Start Time:   4:00   pm                 Session End Time:     4:15  pm    Session Length:     15 mins         Patient Goal:   To utilize suboxone assisted treatment for sobriety and long term recovery.     Goal Progress:   Ongoing.    Key Risk Factors to Recovery:   PRC encourages being aware of risk factors that can lead to re-use which include avoiding isolation, avoiding triggers and managing cravings in a healthy manner. being open and willing to acceptance and change on a daily basis.  PRC encourages pt to establish a sober network calling tree to reach out to when needed.  Continue to practice honesty with ourselves and trusted support person(s).   PRC encourages regular attendance at recovery based meetings as well as finding a sponsor for mentoring and accountability.   PRC encourages consideration of other services such as counseling for mental health issues which can correlate with our substance use.      Support Needs:   Ongoing care, support and resources for opioid substance use disorder.     Follow up:   ROSY has provided pt with his contact " information for support and resource needs.    PRC and pt agree to meet during an upcoming  visit.       [facility]  [address], [address]   [place], [place], [address]  Clinic Phone: [phone]  Clinic Fax: [phone]  Peer  phone: [phone]    Open Monday - Friday  9:00am-4:00pm  Walk in hours: 9am-3pm      Laura Cary  March 10, 2023  4:24 PM    Bishnu MARTE provides clinical oversite and supervision of care.

## 2023-03-12 ENCOUNTER — TELEPHONE (OUTPATIENT)
Dept: BEHAVIORAL HEALTH | Facility: CLINIC | Age: 38
End: 2023-03-12

## 2023-03-12 RX ORDER — BUPRENORPHINE AND NALOXONE 8; 2 MG/1; MG/1
2 FILM, SOLUBLE BUCCAL; SUBLINGUAL DAILY
Qty: 20 FILM | Refills: 0 | COMMUNITY
Start: 2023-03-12

## 2023-03-12 NOTE — TELEPHONE ENCOUNTER
Pt is interested in Sublocade.  Please obtain insurance authorization.   Anticipate UDS +buprenorphine when he returns in one week.     - I am certified to treat addictions under DATA 2000 waiver, XDEA # [id number]  - I have reviewed recommendations for comprehensive treatment plan with the patient  - I have reviewed the patient's medications comprehensively  and provided education to the patient on risks associated with concurrent use of benzodiazepines, alcohol, other sedatives with opioids  - I have recommended concomitant psychosocial support  - I have complied with all aspects of REMS program for Sublocade. [facility] where Sublocade will be administered is in compliance with all aspects of REMS program.   - Patient meets DSM-5 criteria for moderate or severe opioid use disorder  - Patient has been prescribed buprenorphine 8-24mg/day for >1 week  - Patient will discontinue sublingual buprenorphine when steady state achieved after starting Sublocade  - Patient does not have severe hepatic impairment.   - Patient does not have a history of long QT syndrome  - Patient does not take any antiarrhythmic medications or other medications known to significantly prolong QT interval   - Patient will not be receiving methadone while on Sublocade  - Patient will not be receiving any other long acting products for the treatment of opioid use disorder while on Sublocade

## 2023-03-12 NOTE — TELEPHONE ENCOUNTER
Please follow-up with pt regarding his ability to start buprenorphine.    Also to remind him of recommendation to return in one week for follow up.

## 2023-03-13 ENCOUNTER — TELEPHONE (OUTPATIENT)
Dept: BEHAVIORAL HEALTH | Facility: CLINIC | Age: 38
End: 2023-03-13
Payer: COMMERCIAL

## 2023-03-13 NOTE — TELEPHONE ENCOUNTER
Writer attempted to contact patient via telephone, no answer, and voicemail has not been set up yet.     -Sublocade has been approved as well.    [facility]  [address], [address]   [place], [place], [address]  Phone: [phone]  Fax: [phone]    Open Monday-Friday  9:00am-4:00pm  Closed over lunch hour  Walk in hours: 9am-11:30am and 12:30-3pm    Luisa Marcelo RN on 3/13/2023 at 11:02 AM

## 2023-03-13 NOTE — TELEPHONE ENCOUNTER
Received fax from pharmacy requesting prior authorization for Naloxone HCL 4 MG/0.1 ML. Phone call to pharmacy. Prescription went through insurance without difficulty when ran as brand name. No prior authorization needed.    Luisa Marcelo RN on 3/13/2023 at 3:54 PM

## 2023-03-13 NOTE — TELEPHONE ENCOUNTER
Writer attempted to reach patient via telephone, no answer, and voicemail has not been set up yet.     Luisa Marcelo RN on 3/13/2023 at 11:04 AM

## 2023-03-14 ENCOUNTER — TELEPHONE (OUTPATIENT)
Dept: ADDICTION MEDICINE | Facility: CLINIC | Age: 38
End: 2023-03-14
Payer: COMMERCIAL

## 2023-03-14 DIAGNOSIS — F11.20 OPIOID USE DISORDER, SEVERE, DEPENDENCE (H): Primary | ICD-10-CM

## 2023-03-14 PROCEDURE — 99207 PR NO BILLABLE SERVICE THIS VISIT: CPT

## 2023-03-15 LAB
FENTANYL UR-MCNC: 584.6 NG/ML
NORFENTANYL UR-MCNC: >1000 NG/ML

## 2023-03-16 NOTE — TELEPHONE ENCOUNTER
Writer attempted to contact patient via telephone, no answer, and voicemail has not been set up yet.      [facility]  [address], [address]   [place], [address]  Phone: [phone]  Fax: [phone]    Open Monday-Friday  Closed over lunch hour  Walk in hours: 9am-11:30am and 12:30-3pm    Luisa Marcelo RN on 3/16/2023 at 2:10 PM

## 2023-03-16 NOTE — TELEPHONE ENCOUNTER
Writer attempted to reach patient via telephone, no answer, and no voicemail.    [facility]  [address], [address]   [place], [address]  Phone: [phone]  Fax: [phone]    Open Monday-Friday  Closed over lunch hour  Walk in hours: 9am-11:30am and 12:30-3pm    Luisa Marcelo RN on 3/16/2023 at 1:53 PM

## 2023-03-17 NOTE — TELEPHONE ENCOUNTER
Writer attempted to reach patient per provider directive via telephone, no answer, and no voicemail.    -Please follow-up with pt regarding his ability to start buprenorphine.    Also to remind him of recommendation to return in one week for follow up.      [place] [place] [address], [address]   [place], [address]  Phone: [phone]  Fax: [phone]    Open Monday-Friday  Closed over lunch hour  Walk in hours: 9am-11:30am and 12:30-3pm    Luisa Marcelo RN on 3/17/2023 at 11:14 AM

## 2023-03-17 NOTE — TELEPHONE ENCOUNTER
Writer attempted to reach patient via telephone regarding Sublocade approval, no answer, no voicemail, nor MyChart.    [facility]  [address], [address]   [place], [address]  Phone: [phone]  Fax: [phone]    Open Monday-Friday  Closed over lunch hour  Walk in hours: 9am-11:30am and 12:30-3pm    Luisa Marcelo RN on 3/17/2023 at 11:12 AM

## 2023-04-03 ENCOUNTER — APPOINTMENT (OUTPATIENT)
Dept: NEUROLOGY | Facility: CLINIC | Age: 38
End: 2023-04-03

## 2023-04-06 NOTE — TELEPHONE ENCOUNTER
"Received a faxed refill request for traZODone (DESYREL) 50 MG tablets from [facility] pharmacy.    RN denied refill, \"Patient needs to be seen in the clinic for further refills\".    Laurel Scott RN on 4/6/2023 at 11:38 AM      "

## 2023-04-25 ENCOUNTER — RX RENEWAL (OUTPATIENT)
Age: 38
End: 2023-04-25

## 2023-05-05 ENCOUNTER — TELEPHONE (OUTPATIENT)
Dept: BEHAVIORAL HEALTH | Facility: CLINIC | Age: 38
End: 2023-05-05
Payer: COMMERCIAL

## 2023-05-05 NOTE — TELEPHONE ENCOUNTER
Reason for call:  Other   Patient called regarding (reason for call): call back  Additional comments: pt phone  when provider or nurse called pt back- PLEASE CALL [phone]     Phone number to reach patient:  [phone]     Best Time:  ANY     Can we leave a detailed message on this number?  YES    Travel screening: Negative

## 2023-05-05 NOTE — TELEPHONE ENCOUNTER
Writer attempted to contact patient, no answer at either phone number provided, voicemail box was full, unable to leave a message.     Luisa Marcelo RN on 5/5/2023 at 12:51 PM

## 2023-05-05 NOTE — TELEPHONE ENCOUNTER
Reason for call:  Other   Patient called regarding (reason for call): call back  Additional comments: pt is calling to speak with provider  . Pt did not leave detail info. Please call patient     Phone number to reach patient:please call [phone] (home)    Best Time:  Any     Can we leave a detailed message on this number?  YES    Travel screening: Negative

## 2023-05-10 ENCOUNTER — TELEPHONE (OUTPATIENT)
Dept: ADDICTION MEDICINE | Facility: CLINIC | Age: 38
End: 2023-05-10
Payer: COMMERCIAL

## 2023-05-10 DIAGNOSIS — F11.20 OPIOID USE DISORDER, SEVERE, DEPENDENCE (H): Primary | ICD-10-CM

## 2023-05-10 PROCEDURE — 99207 PR NO BILLABLE SERVICE THIS VISIT: CPT

## 2023-05-10 NOTE — TELEPHONE ENCOUNTER
Per provider referral, Peer  placed a telephone recovery support call to pt for discussion regarding current status and to provide encouragement, support and resources as needed. Said wasn't available.

## 2023-06-07 DIAGNOSIS — R69 DIAGNOSIS UNKNOWN: Primary | ICD-10-CM

## 2023-06-28 ENCOUNTER — HOSPITAL ENCOUNTER (EMERGENCY)
Facility: CLINIC | Age: 38
Discharge: HOME | End: 2023-06-28
Attending: EMERGENCY MEDICINE | Admitting: EMERGENCY MEDICINE
Payer: COMMERCIAL

## 2023-06-28 ENCOUNTER — APPOINTMENT (OUTPATIENT)
Dept: GENERAL RADIOLOGY | Facility: CLINIC | Age: 38
End: 2023-06-28
Attending: PHYSICIAN ASSISTANT
Payer: COMMERCIAL

## 2023-06-28 VITALS
RESPIRATION RATE: 18 BRPM | HEART RATE: 92 BPM | DIASTOLIC BLOOD PRESSURE: 83 MMHG | OXYGEN SATURATION: 96 % | SYSTOLIC BLOOD PRESSURE: 125 MMHG | TEMPERATURE: 97.9 F

## 2023-06-28 DIAGNOSIS — S51.812A LACERATION OF LEFT FOREARM, INITIAL ENCOUNTER: ICD-10-CM

## 2023-06-28 PROCEDURE — 12002 RPR S/N/AX/GEN/TRNK2.6-7.5CM: CPT | Performed by: EMERGENCY MEDICINE

## 2023-06-28 PROCEDURE — 73090 X-RAY EXAM OF FOREARM: CPT | Mod: 26 | Performed by: RADIOLOGY

## 2023-06-28 PROCEDURE — 73090 X-RAY EXAM OF FOREARM: CPT | Mod: LT

## 2023-06-28 PROCEDURE — 99283 EMERGENCY DEPT VISIT LOW MDM: CPT | Performed by: EMERGENCY MEDICINE

## 2023-06-28 PROCEDURE — 99284 EMERGENCY DEPT VISIT MOD MDM: CPT | Mod: 25 | Performed by: EMERGENCY MEDICINE

## 2023-06-28 RX ORDER — LIDOCAINE HYDROCHLORIDE AND EPINEPHRINE 10; 10 MG/ML; UG/ML
10 INJECTION, SOLUTION INFILTRATION; PERINEURAL ONCE
Status: DISCONTINUED | OUTPATIENT
Start: 2023-06-28 | End: 2023-06-28 | Stop reason: HOSPADM

## 2023-06-28 RX ORDER — CEPHALEXIN 500 MG/1
500 CAPSULE ORAL 4 TIMES DAILY
Qty: 20 CAPSULE | Refills: 0 | Status: SHIPPED | OUTPATIENT
Start: 2023-06-28 | End: 2023-07-03

## 2023-06-28 ASSESSMENT — ACTIVITIES OF DAILY LIVING (ADL)
ADLS_ACUITY_SCORE: 33
ADLS_ACUITY_SCORE: 35

## 2023-06-28 NOTE — ED PROVIDER NOTES
ED Provider Note  [facility]      History     Chief Complaint   Patient presents with     Laceration     HPI  Said TEVIN Arreola is a 38 year old male right-hand-dominant who presents emergency department today with concerns for left forearm laceration.  Just prior to coming to the emergency department, patient was changing out an air conditioning unit, states that he subsequently cut the distal volar aspect of the left forearm on the glass pane.  Patient sustained 2 lacerations across the volar aspect of the left forearm.  He states he does not think there is any glass remaining in the arm.  Patient reports some associated tingling around the laceration site without any numbness or tingling of the left hand.  Patient denies any other injury sustained prior to presenting.  He is not anticoagulated.  Patient initially did irrigate the wound out with water and states he also poured alcohol in the wound.  Last tetanus 2020.    Past Medical History  No past medical history on file.  No past surgical history on file.  cephALEXin (KEFLEX) 500 MG capsule  buprenorphine HCl-naloxone HCl (SUBOXONE) 8-2 MG per film  cloNIDine (CATAPRES) 0.1 MG tablet  gabapentin (NEURONTIN) 300 MG capsule  LORazepam (ATIVAN) 1 MG tablet  naloxone (NARCAN) 4 MG/0.1ML nasal spray  ondansetron (ZOFRAN ODT) 4 MG ODT tab  traZODone (DESYREL) 50 MG tablet      Allergies   Allergen Reactions     Acetylcysteine Itching     Rash      Family History  No family history on file.  Social History   Social History     Tobacco Use     Smoking status: Smoker, Current Status Unknown     Packs/day: 0.25     Types: Cigarettes   Substance Use Topics     Alcohol use: No     Drug use: No         A medically appropriate review of systems was performed with pertinent positives and negatives noted in the HPI, and all other systems negative.    Physical Exam   BP: 125/83  Pulse: 92  Temp: 97.9  F (36.6  C)  Resp: 18  SpO2: 96 %  Physical  Exam    GENERAL APPEARANCE: The patient is well developed, well appearing, and in no acute distress.  HEAD:  Normocephalic and atraumatic.   EENT: Voice normal.  NECK: Trachea is midline.  EXTREMITIES: Examination of the left upper extremity reveals 2 visible lacerations to the distal aspect of the left volar forearm.  Longest laceration is approximately 3 to 4 cm in length, without any visible foreign body, there is visible subcutaneous fat exposed without any tendon or bone.  There is another, shorter laceration approximately 2 cm in length more lateral, without any foreign body bone or tendon, also visible subcutaneous fat here as well.  Patient has full range of motion digits 1 through 5 of the left hand and is able to fully flex and extend the left wrist.  NEUROLOGIC: No focal sensory or motor deficits are noted.  Gross sensation is intact distal aspect left upper extremity.  Peripheral vascular: Radial pulse 2+ on the left.  PSYCHIATRIC: The patient is awake, alert.  Appropriate mood and affect.  SKIN: Warm, dry, and well perfused. Good turgor.      ED Course, Procedures, & Data      [facility]    -Laceration Repair    Date/Time: 6/28/2023 2:40 PM    Performed by: Antonietta Moreno PA-C  Authorized by: Antonietta Moreno PA-C    Risks, benefits and alternatives discussed.      ANESTHESIA (see MAR for exact dosages):     Anesthesia method:  Local infiltration    Local anesthetic:  Lidocaine 1% WITH epi  LACERATION DETAILS     Location:  Shoulder/arm    Shoulder/arm location:  L lower arm    Length (cm):  7    Depth (mm):  3    REPAIR TYPE:     Repair type:  Simple      EXPLORATION:     Wound exploration: wound explored through full range of motion and entire depth of wound probed and visualized      Wound extent: no foreign body, no nerve damage, no tendon damage, no underlying fracture and no vascular damage      Contaminated: no      TREATMENT:     Area cleansed  with:  Saline    Amount of cleaning:  Standard    Irrigation solution:  Sterile saline    Visualized foreign bodies/material removed: no      SKIN REPAIR     Repair method:  Sutures    Suture size:  4-0    Suture material:  Prolene    Suture technique:  Simple interrupted    Number of sutures:  15 (12 to close the long wound, 3 in the shorter wound)    POST-PROCEDURE DETAILS     Dressing:  Antibiotic ointment and non-adherent dressing        PROCEDURE    Patient Tolerance:  Patient tolerated the procedure well with no immediate complications            Results for orders placed or performed during the hospital encounter of 06/28/23   XR Forearm Left 2 Views     Status: None    Narrative    2 views left forearm radiographs 6/28/2023 1:46 PM    History: Left forearm laceration with glass, evaluate for foreign body    Comparison: None available.    Findings:    AP and lateral views of the left forearm were obtained.     No acute osseous abnormality.      Elbow and wrist joints are incompletely assessed but grossly  congruent.     Mild subcutaneous soft tissue stranding especially volar ulnar aspect  of the distal forearm with dressing. No radiopaque foreign body.      Impression    Impression:  1. No acute osseous abnormality.  2. Mild subcutaneous soft tissue stranding especially volar ulnar  aspect of the distal forearm. No radiopaque foreign body.    [employer]         SYSTEM ID:  [id number]     Medications   lidocaine 1% with EPINEPHrine 1:100,000 injection 10 mL (has no administration in time range)     Labs Ordered and Resulted from Time of ED Arrival to Time of ED Departure - No data to display  XR Forearm Left 2 Views   Final Result   Impression:   1. No acute osseous abnormality.   2. Mild subcutaneous soft tissue stranding especially volar ulnar   aspect of the distal forearm. No radiopaque foreign body.      [employer]            SYSTEM ID:  [id number]             Critical care was not performed.      Medical Decision Making  The patient's presentation was of moderate complexity (an acute complicated injury).    The patient's evaluation involved:  review of external note(s) from 1 sources (Prior tetanus history)  ordering and/or review of 1 test(s) in this encounter (see separate area of note for details)    The patient's management necessitated moderate risk (a decision regarding minor procedure (laceration repair) with risk factors of none).      Assessment & Plan    This is an otherwise healthy 38-year male presenting with concerns for left forearm laceration sustained prior to presenting the emergency department.  On presentation vital signs within normal range.  Physical exam shows 2 lacerations running a along the distal volar forearm on the left side.  There is no visible bone tendon or foreign body noted.  With the nature of the wound I did discuss with the patient recommendations of x-ray to evaluate for foreign body and to help ensure there is no glass retained in the wound.  Patient agreeable.  I personally reviewed the images as well as radiologist overread showing no foreign body.  Wound was irrigated by nursing staff at bedside.  I was able to successfully anesthetize the area and place 15 sutures to close both wounds.  We will place patient on a short course of Keflex.  We discussed findings for infection for which patient would need to return.  Patient is up-to-date with his tetanus.  Patient has no other questions or concerns at this time.  Red flag signs were addressed, and they were in agreement with the patient care plan provided.  Prior to dressing placement patient elected to leave the emergency department before notifying nursing staff.    Patient seen and discussed with attending physician , who agrees with my plan of care.      I have reviewed the nursing notes. I have reviewed the findings, diagnosis, plan and need for follow up with the patient.    New Prescriptions     CEPHALEXIN (KEFLEX) 500 MG CAPSULE    Take 1 capsule (500 mg) by mouth 4 times daily for 5 days       Final diagnoses:   Laceration of left forearm, initial encounter       Antonietta Moreno, PAC  [facility] EMERGENCY DEPARTMENT  6/28/2023    -------    --    ED Attending Physician Attestation    DEVIKA Pak MD, cared for this patient with the Advanced Practice Provider (CHANDU). I have performed a history and physical examination of the patient independent of the CHANDU. I reviewed the CHANDU's documentation above and agree with the documented findings and plan of care. I personally provided a substantive portion of the care for this patient, including the complete Medical Decision Making. Please see the CHANDU's documentation for full details.    Summary of HPI, PE, ED Course   Patient is a 38 year old male evaluated in the emergency department for arm lacerations.   Exam and ED course notable for distal cap refill/strength/sensation intact. No active bleeding    After the completion of care in the emergency department, the patient was discharged.    Critical Care & Procedures  I was present with the resident during the key or critical portions of the following procedure(s): lac repair        Medical Decision Making  The patient's presentation was of moderate complexity (an acute complicated injury).    The patient's evaluation involved:  ordering and/or review of 1 test(s) in this encounter (see separate area of note for details)    The patient's management necessitated moderate risk (prescription drug management including medications given in the ED).    A/P:  Two arm lacerations, Long in length, no signs of neurovascular compromise.  Rule out FB.    - xray, abx, thorough washout, lac repair      Aravind Pak MD  Emergency Medicine        Aravind Pak MD  06/29/23 9509

## 2023-06-28 NOTE — ED TRIAGE NOTES
Pt ambulatory to triage pt was trying to open stuck window, window ended up shattering and 3 lacs on L forearm. Wrapped with band aids and a washcloth. Bleeding has stopped now.      Triage Assessment     Row Name 06/28/23 1224       Triage Assessment (Adult)    Airway WDL WDL       Respiratory WDL    Respiratory WDL WDL       Skin Circulation/Temperature WDL    Skin Circulation/Temperature WDL WDL       Cardiac WDL    Cardiac WDL WDL       Peripheral/Neurovascular WDL    Peripheral Neurovascular WDL WDL       Cognitive/Neuro/Behavioral WDL    Cognitive/Neuro/Behavioral WDL WDL

## 2023-06-28 NOTE — DISCHARGE INSTRUCTIONS
Here in the emergency department, I was able to close up your cut today with stitches.  The stitches will need to come out in about 10 days.  We discussed going to urgent care primary care to have these taken out.  In the meantime recommend triple antibiotic ointment with daily dressing changes.  Return to emergency department should you develop redness swelling pus fevers any other new or worsening symptoms.  Recommend minimal use of the arm until the wound has fully healed.    If you develop any new or worsening symptoms, is important to return right away to the emergency department for further evaluation and management.

## 2023-07-26 ENCOUNTER — RX RENEWAL (OUTPATIENT)
Age: 38
End: 2023-07-26

## 2023-09-18 ENCOUNTER — RX RENEWAL (OUTPATIENT)
Age: 38
End: 2023-09-18

## 2023-10-04 ENCOUNTER — APPOINTMENT (OUTPATIENT)
Dept: OPHTHALMOLOGY | Facility: CLINIC | Age: 38
End: 2023-10-04

## 2023-12-19 ENCOUNTER — RX RENEWAL (OUTPATIENT)
Age: 38
End: 2023-12-19

## 2023-12-19 RX ORDER — NORTRIPTYLINE HYDROCHLORIDE 75 MG/1
75 CAPSULE ORAL
Qty: 90 | Refills: 0 | Status: ACTIVE | COMMUNITY
Start: 2022-07-20 | End: 1900-01-01

## 2023-12-31 ENCOUNTER — APPOINTMENT (OUTPATIENT)
Dept: GENERAL RADIOLOGY | Facility: CLINIC | Age: 38
End: 2023-12-31
Attending: FAMILY MEDICINE
Payer: COMMERCIAL

## 2023-12-31 ENCOUNTER — HOSPITAL ENCOUNTER (EMERGENCY)
Facility: CLINIC | Age: 38
Discharge: HOME | End: 2023-12-31
Attending: FAMILY MEDICINE | Admitting: FAMILY MEDICINE
Payer: COMMERCIAL

## 2023-12-31 VITALS
RESPIRATION RATE: 18 BRPM | HEART RATE: 97 BPM | SYSTOLIC BLOOD PRESSURE: 120 MMHG | OXYGEN SATURATION: 98 % | DIASTOLIC BLOOD PRESSURE: 77 MMHG | TEMPERATURE: 97.6 F

## 2023-12-31 DIAGNOSIS — F11.10 OPIATE ABUSE, CONTINUOUS (H): ICD-10-CM

## 2023-12-31 DIAGNOSIS — F15.10 METHAMPHETAMINE ABUSE (H): ICD-10-CM

## 2023-12-31 DIAGNOSIS — J40 BRONCHITIS WITH ACUTE WHEEZING: ICD-10-CM

## 2023-12-31 LAB
AMPHETAMINES UR QL SCN: ABNORMAL
BARBITURATES UR QL SCN: ABNORMAL
BENZODIAZ UR QL SCN: ABNORMAL
BZE UR QL SCN: ABNORMAL
CANNABINOIDS UR QL SCN: ABNORMAL
FENTANYL UR QL: ABNORMAL
FLUAV RNA SPEC QL NAA+PROBE: NEGATIVE
FLUBV RNA RESP QL NAA+PROBE: NEGATIVE
OPIATES UR QL SCN: ABNORMAL
PCP QUAL URINE (ROCHE): ABNORMAL
RSV RNA SPEC NAA+PROBE: NEGATIVE
SARS-COV-2 RNA RESP QL NAA+PROBE: NEGATIVE

## 2023-12-31 PROCEDURE — 250N000009 HC RX 250: Performed by: FAMILY MEDICINE

## 2023-12-31 PROCEDURE — 94640 AIRWAY INHALATION TREATMENT: CPT | Performed by: FAMILY MEDICINE

## 2023-12-31 PROCEDURE — 71046 X-RAY EXAM CHEST 2 VIEWS: CPT

## 2023-12-31 PROCEDURE — 71046 X-RAY EXAM CHEST 2 VIEWS: CPT | Mod: 26 | Performed by: STUDENT IN AN ORGANIZED HEALTH CARE EDUCATION/TRAINING PROGRAM

## 2023-12-31 PROCEDURE — 80307 DRUG TEST PRSMV CHEM ANLYZR: CPT | Performed by: FAMILY MEDICINE

## 2023-12-31 PROCEDURE — 87637 SARSCOV2&INF A&B&RSV AMP PRB: CPT | Performed by: FAMILY MEDICINE

## 2023-12-31 PROCEDURE — 99284 EMERGENCY DEPT VISIT MOD MDM: CPT | Mod: 25 | Performed by: FAMILY MEDICINE

## 2023-12-31 PROCEDURE — 99284 EMERGENCY DEPT VISIT MOD MDM: CPT | Performed by: FAMILY MEDICINE

## 2023-12-31 RX ORDER — IPRATROPIUM BROMIDE AND ALBUTEROL SULFATE 2.5; .5 MG/3ML; MG/3ML
3 SOLUTION RESPIRATORY (INHALATION) ONCE
Status: COMPLETED | OUTPATIENT
Start: 2023-12-31 | End: 2023-12-31

## 2023-12-31 RX ORDER — BUPRENORPHINE HYDROCHLORIDE AND NALOXONE HYDROCHLORIDE DIHYDRATE 8; 2 MG/1; MG/1
1 TABLET SUBLINGUAL 2 TIMES DAILY
Qty: 10 TABLET | Refills: 0 | Status: SHIPPED | OUTPATIENT
Start: 2023-12-31

## 2023-12-31 RX ORDER — AZITHROMYCIN 250 MG/1
TABLET, FILM COATED ORAL
Qty: 6 TABLET | Refills: 0 | Status: SHIPPED | OUTPATIENT
Start: 2023-12-31

## 2023-12-31 RX ORDER — ALBUTEROL SULFATE 90 UG/1
2 AEROSOL, METERED RESPIRATORY (INHALATION) EVERY 6 HOURS PRN
Qty: 18 G | Refills: 0 | Status: SHIPPED | OUTPATIENT
Start: 2023-12-31

## 2023-12-31 RX ADMIN — IPRATROPIUM BROMIDE AND ALBUTEROL SULFATE 3 ML: .5; 3 SOLUTION RESPIRATORY (INHALATION) at 12:46

## 2023-12-31 ASSESSMENT — ACTIVITIES OF DAILY LIVING (ADL)
ADLS_ACUITY_SCORE: 35
ADLS_ACUITY_SCORE: 35

## 2023-12-31 NOTE — ED TRIAGE NOTES
"Pt arrives ambulatory to triage c/o cough \"that's lasted for a long time\"  Endorses using meth and opioids last night.     Triage Assessment (Adult)       Row Name 12/31/23 0919          Triage Assessment    Airway WDL WDL        Respiratory WDL    Respiratory WDL WDL        Skin Circulation/Temperature WDL    Skin Circulation/Temperature WDL WDL        Cardiac WDL    Cardiac WDL X;chest pain        Peripheral/Neurovascular WDL    Peripheral Neurovascular WDL WDL        Cognitive/Neuro/Behavioral WDL    Cognitive/Neuro/Behavioral WDL WDL                     "

## 2023-12-31 NOTE — DISCHARGE INSTRUCTIONS
Home.  Your covid and influenza were negative.  Your xray showed some mild changes consistent with may be bronchitis.  Recommendations at this point as we did not do formal detox here we can offer you other options to [address] versus [facility] versus [facility] in [place] are options.  You feel comfortable going home I did prescribe the Suboxone sublingual tablets to use twice a day as needed once withdrawal symptoms began.  Also take the Z-Savage for infection along with albuterol inhaler for the wheezing and cough.  Follow-up with primary physician  Also follow-up with the recovery clinic on Tuesday they take walk-ins who can continue to work with you regarding chemical dependency issues and detox issues as an outpatient.  Return if any concerns.      Please make an appointment to follow up with Your Primary Care Provider, [facility] (phone: [phone]), [facility] - [facility] (phone: [phone]), and Primary Care - [facility] (phone: [phone]) as soon as possible.      Results for orders placed or performed during the hospital encounter of 12/31/23   XR Chest 2 Views     Status: None (Preliminary result)    Impression    RESIDENT PRELIMINARY INTERPRETATION  IMPRESSION:   Streaky perihilar and bibasilar opacities, likely to represent  atelectasis or edema, cannot exclude atypical infection.   Symptomatic Influenza A/B, RSV, & SARS-CoV2 PCR (COVID-19) Nasopharyngeal     Status: Normal    Specimen: Nasopharyngeal; Swab   Result Value Ref Range    Influenza A PCR Negative Negative    Influenza B PCR Negative Negative    RSV PCR Negative Negative    SARS CoV2 PCR Negative Negative    Narrative    Testing was performed using the Xpert Xpress CoV2/Flu/RSV Assay on the [employer] GeneXpert Instrument. This test should be ordered for the detection of SARS-CoV-2, influenza, and RSV viruses in individuals who meet clinical and/or epidemiological criteria. Test performance is unknown in  asymptomatic patients. This test is for in vitro diagnostic use under the FDA EUA for laboratories certified under CLIA to perform high or moderate complexity testing. This test has not been FDA cleared or approved. A negative result does not rule out the presence of PCR inhibitors in the specimen or target RNA in concentration below the limit of detection for the assay. If only one viral target is positive but coinfection with multiple targets is suspected, the sample should be re-tested with another FDA cleared, approved, or authorized test, if coinfection would change clinical management. This test was validated by the [facility]. These laboratories are certified under the Clinical Laboratory Improvement Amendments of 1988 (CLIA-88) as qualified to perform high complexity laboratory testing.   Urine Drug Screen Panel     Status: Abnormal   Result Value Ref Range    Amphetamines Urine Screen Positive (A) Screen Negative    Barbituates Urine Screen Negative Screen Negative    Benzodiazepine Urine Screen Negative Screen Negative    Cannabinoids Urine Screen Negative Screen Negative    Cocaine Urine Screen Negative Screen Negative    Fentanyl Qual Urine Screen Positive (A) Screen Negative    Opiates Urine Screen Negative Screen Negative    PCP Urine Screen Negative Screen Negative   Urine Drug Screen     Status: Abnormal    Narrative    The following orders were created for panel order Urine Drug Screen.  Procedure                               Abnormality         Status                     ---------                               -----------         ------                     Urine Drug Screen Panel[[id number]]      Abnormal            Final result                 Please view results for these tests on the individual orders.

## 2023-12-31 NOTE — ED PROVIDER NOTES
ED Provider Note  [facility]      History     Chief Complaint   Patient presents with    Cough     The history is provided by the patient and medical records.     Rosalio Arreola is a 38 year old male with a history of polysubstance abuse (methamphetamine, opioid, alcohol), tobacco dependence, and MDD presents to the ED for cough.    Patient states that he has been having a cough for a while, thinks it might be since his COVID shot 2 weeks ago.  Symptoms have becoming worse.  Cough is not productive.  He also has chest pain when coughing and a lot of wheezing.  Patient does not have a history of asthma and does not use an inhaler.  He also has a history of TB that was treated back in 1995.  Patient states he also has little sweatiness at night.  Patient has a history of methamphetamine and opioid use, was on Suboxone back in March which was helpful, but is currently using.  Uses fentanyl which she burns and smokes, and does the same thing for methamphetamine which she takes together but not mixed.  Patient denies IV drug use.  Last fentanyl and methamphetamine use was last night.  Patient read a couple of abuse about detox and wants to get clean.    Past Medical History  No past medical history on file.  No past surgical history on file.  albuterol (PROAIR HFA/PROVENTIL HFA/VENTOLIN HFA) 108 (90 Base) MCG/ACT inhaler  azithromycin (ZITHROMAX Z-JENARO) 250 MG tablet  buprenorphine-naloxone (SUBOXONE) 8-2 MG SUBL sublingual tablet  buprenorphine HCl-naloxone HCl (SUBOXONE) 8-2 MG per film  cloNIDine (CATAPRES) 0.1 MG tablet  gabapentin (NEURONTIN) 300 MG capsule  LORazepam (ATIVAN) 1 MG tablet  naloxone (NARCAN) 4 MG/0.1ML nasal spray  ondansetron (ZOFRAN ODT) 4 MG ODT tab  traZODone (DESYREL) 50 MG tablet      Allergies   Allergen Reactions    Acetylcysteine Itching     Rash      Family History  No family history on file.  Social History   Social History     Tobacco Use    Smoking status: Smoker,  Current Status Unknown     Packs/day: .25     Types: Cigarettes   Substance Use Topics    Alcohol use: No    Drug use: No         A medically appropriate review of systems was performed with pertinent positives and negatives noted in the HPI, and all other systems negative.    Physical Exam   BP: 125/80  Pulse: 97  Temp: 97.6  F (36.4  C)  Resp: 14  SpO2: 100 %  Physical Exam  Vitals and nursing note reviewed.   Constitutional:       General: He is in acute distress.      Appearance: He is well-developed. He is not toxic-appearing or diaphoretic.      Comments: Patient nontoxic here vitally stable   HENT:      Head: Normocephalic and atraumatic.      Nose: No congestion.      Mouth/Throat:      Mouth: Mucous membranes are moist.      Pharynx: Oropharynx is clear.   Eyes:      General: No scleral icterus.     Extraocular Movements: Extraocular movements intact.      Conjunctiva/sclera: Conjunctivae normal.      Pupils: Pupils are equal, round, and reactive to light.   Cardiovascular:      Rate and Rhythm: Normal rate and regular rhythm.   Pulmonary:      Effort: Pulmonary effort is normal. No respiratory distress.      Breath sounds: Wheezing present.      Comments: Mild expiratory wheezing noted no respiratory distress.  Abdominal:      General: Abdomen is flat.      Tenderness: There is no abdominal tenderness. There is no guarding.   Musculoskeletal:         General: No swelling or tenderness.      Cervical back: Normal range of motion and neck supple.      Comments: Negative Homans' sign   Skin:     General: Skin is warm and dry.      Capillary Refill: Capillary refill takes less than 2 seconds.      Findings: No rash.   Neurological:      General: No focal deficit present.      Mental Status: He is alert and oriented to person, place, and time. Mental status is at baseline.   Psychiatric:      Comments: Patient appropriate here no active withdrawal symptoms noted at this point.  Is not delusional           ED  Course, Procedures, & Data      Records medications reviewed.  Patient been seen in the past the ER for lower extremity swelling laceration of forearm etc. opioid use disorder etc.  Medications reviewed etc.    Patient vitally stable here in the ER.  We did do a chest x-ray which showed some bibasilar streakiness but no other major findings seen.  Personally reviewed by myself also.  COVID influenza RSV testing was negative    Drug screen positive for amphetamines along with fentanyl.    Discussed with intake also did get some resources other options as we do not do inpatient detox would be that of either [address] [place] [address] versus [facility] versus [facility].  Here in the ER patient otherwise stable status playing and for all symptoms at this point.  Discussed with patient regarding follow-up he is comfortable following up with recovery clinic in the next couple days.  In the meantime we will prescribe Suboxone 8-2 sublingual tablets twice a day for withdrawal symptoms once they begin.  Patient also prescribed Z-Savage and an inhaler for underlying bronchitis following up with MD also regarding this and return if any worsening symptoms.    Patient received a DuoNeb here in the ER also with slight improvement.      Procedures                     Results for orders placed or performed during the hospital encounter of 12/31/23   XR Chest 2 Views     Status: None    Narrative    EXAM: XR CHEST 2 VIEWS  12/31/2023 10:44 AM     HISTORY:  cough       COMPARISON:  None    FINDINGS:   Trachea is midline. Cardiac silhouette is within normal limits.  Pulmonary vasculature is distinct. No focal airspace opacity, pleural  effusion, pneumothorax. Streaky perihilar and bibasilar opacities.    No acute osseous abnormality. Visualized soft tissues and upper  abdomen are unremarkable.         Impression    IMPRESSION:     Streaky perihilar and bibasilar predominant pulmonary opacities, may  represent atypical infection versus pulmonary  edema/atelectasis    I have personally reviewed the examination and initial interpretation  and I agree with the findings.    KATHY HOOK MD         SYSTEM ID:  [id number]   Symptomatic Influenza A/B, RSV, & SARS-CoV2 PCR (COVID-19) Nasopharyngeal     Status: Normal    Specimen: Nasopharyngeal; Swab   Result Value Ref Range    Influenza A PCR Negative Negative    Influenza B PCR Negative Negative    RSV PCR Negative Negative    SARS CoV2 PCR Negative Negative    Narrative    Testing was performed using the Xpert Xpress CoV2/Flu/RSV Assay on the Cepheid GeneXpert Instrument. This test should be ordered for the detection of SARS-CoV-2, influenza, and RSV viruses in individuals who meet clinical and/or epidemiological criteria. Test performance is unknown in asymptomatic patients. This test is for in vitro diagnostic use under the FDA EUA for laboratories certified under CLIA to perform high or moderate complexity testing. This test has not been FDA cleared or approved. A negative result does not rule out the presence of PCR inhibitors in the specimen or target RNA in concentration below the limit of detection for the assay. If only one viral target is positive but coinfection with multiple targets is suspected, the sample should be re-tested with another FDA cleared, approved, or authorized test, if coinfection would change clinical management. This test was validated by the [facility]. These laboratories are certified under the Clinical Laboratory Improvement Amendments of 1988 (CLIA-88) as qualified to perform high complexity laboratory testing.   Urine Drug Screen Panel     Status: Abnormal   Result Value Ref Range    Amphetamines Urine Screen Positive (A) Screen Negative    Barbituates Urine Screen Negative Screen Negative    Benzodiazepine Urine Screen Negative Screen Negative    Cannabinoids Urine Screen Negative Screen Negative    Cocaine Urine Screen Negative Screen Negative    Fentanyl  Qual Urine Screen Positive (A) Screen Negative    Opiates Urine Screen Negative Screen Negative    PCP Urine Screen Negative Screen Negative   Urine Drug Screen     Status: Abnormal    Narrative    The following orders were created for panel order Urine Drug Screen.  Procedure                               Abnormality         Status                     ---------                               -----------         ------                     Urine Drug Screen Panel[[id number]]      Abnormal            Final result                 Please view results for these tests on the individual orders.     Medications   ipratropium - albuterol 0.5 mg/2.5 mg/3 mL (DUONEB) neb solution 3 mL (3 mLs Nebulization $Given 12/31/23 1246)     Labs Ordered and Resulted from Time of ED Arrival to Time of ED Departure   URINE DRUG SCREEN PANEL - Abnormal       Result Value    Amphetamines Urine Screen Positive (*)     Barbituates Urine Screen Negative      Benzodiazepine Urine Screen Negative      Cannabinoids Urine Screen Negative      Cocaine Urine Screen Negative      Fentanyl Qual Urine Screen Positive (*)     Opiates Urine Screen Negative      PCP Urine Screen Negative     INFLUENZA A/B, RSV, & SARS-COV2 PCR - Normal    Influenza A PCR Negative      Influenza B PCR Negative      RSV PCR Negative      SARS CoV2 PCR Negative       XR Chest 2 Views   Final Result   IMPRESSION:       Streaky perihilar and bibasilar predominant pulmonary opacities, may   represent atypical infection versus pulmonary edema/atelectasis      I have personally reviewed the examination and initial interpretation   and I agree with the findings.      KATHY HOOK MD            SYSTEM ID:  [id number]             Critical care was not performed.     Medical Decision Making  The patient's presentation was of moderate complexity (an acute illness with systemic symptoms).    The patient's evaluation involved:  review of external note(s) from 3+ sources (see separate  area of note for details)  review of 2 test result(s) ordered prior to this encounter (see separate area of note for details)  ordering and/or review of 2 test(s) in this encounter (see separate area of note for details)  discussion of management or test interpretation with another health professional (see separate area of note for details)    The patient's management necessitated moderate risk (prescription drug management including medications given in the ED).    Assessment & Plan   38-year-old male history of methamphetamine and fentanyl abuse also ongoing cough.  Worsening symptoms last few days here evaluated ER vitally stable this point seems more respiratory chest x-ray shows some mild streaking noted no pneumothorax or effusion noted.  COVID influenza RSV were negative.  Patient given DuoNeb somewhat improved patient also with fentanyl methamphetamine use wanted detox not available as inpatient here did refer him to the recovery clinic along with his prescribed him 5 days of Suboxone 8-2 sublingual tablets to use when symptoms began should return if any concerns patient agrees with this plan was offered other detox but declines those.       I have reviewed the nursing notes. I have reviewed the findings, diagnosis, plan and need for follow up with the patient.    Discharge Medication List as of 12/31/2023  1:03 PM        START taking these medications    Details   albuterol (PROAIR HFA/PROVENTIL HFA/VENTOLIN HFA) 108 (90 Base) MCG/ACT inhaler Inhale 2 puffs into the lungs every 6 hours as needed for shortness of breath, wheezing or cough, Disp-18 g, R-0, Local [facility]acy may dispense brand covered by insurance (Proair, or proventil or ventolin or generic albuterol inhaler)      azithromycin (ZITHROMAX Z-JENARO) 250 MG tablet Take as directed for bronchitis, Disp-6 tablet, R-0, Local Print      buprenorphine-naloxone (SUBOXONE) 8-2 MG SUBL sublingual tablet Place 1 tablet under the tongue 2 times daily For  opiate withdrawal symptoms, Disp-10 tablet, R-0, Local Print             Final diagnoses:   Bronchitis with acute wheezing   Opiate abuse, continuous (H)   Methamphetamine abuse (H)   I, Kale Nascimento, am serving as a trained medical scribe to document services personally performed by Julio Zazueta MD, based to the provider's statements to me.     IJulio MD, was physically present and have reviewed and verified the accuracy of this note documented by Kale Nascimento.       Julio Zazueta MD  [facility] EMERGENCY DEPARTMENT  12/31/2023    This note was created at least in part by the use of dragon voice dictation system. Inadvertent typographical errors may still exist.  Julio Zazueta MD.  Patient evaluated in the emergency department during the COVID-19 pandemic period. Careful attention to patients safety was addressed throughout the evaluation. Evaluation and treatment management was initiated with disposition made efficiently and appropriate as possible to minimize any risk of potential exposure to patient during this evaluation.       Julio Zazueta MD  12/31/23 2001

## 2024-01-29 ENCOUNTER — APPOINTMENT (OUTPATIENT)
Dept: NEUROLOGY | Facility: CLINIC | Age: 39
End: 2024-01-29

## 2024-03-30 NOTE — ED ADULT NURSE NOTE - NSSISCREENINGQ1_ED_A_ED
Report called to receiving facility, [facility], given to Gina Gaming RN.      Enrique Stein, TEMI  03/30/24 5723     No

## 2024-08-07 ENCOUNTER — APPOINTMENT (OUTPATIENT)
Dept: NEUROLOGY | Facility: CLINIC | Age: 39
End: 2024-08-07

## 2024-08-07 PROBLEM — F19.939 DRUG WITHDRAWAL HEADACHE: Status: ACTIVE | Noted: 2020-09-09

## 2024-08-07 PROCEDURE — 99204 OFFICE O/P NEW MOD 45 MIN: CPT

## 2024-08-07 PROCEDURE — G2211 COMPLEX E/M VISIT ADD ON: CPT

## 2024-08-07 NOTE — PHYSICAL EXAM
[General Appearance - Alert] : alert [General Appearance - In No Acute Distress] : in no acute distress [FreeTextEntry1] : Obese [Impaired Insight] : insight and judgment were intact [Oriented To Time, Place, And Person] : oriented to person, place, and time [Memory Recent] : recent memory was not impaired [Affect] : the affect was normal [Person] : oriented to person [Place] : oriented to place [Time] : oriented to time [Concentration Intact] : normal concentrating ability [Fluency] : fluency intact [Comprehension] : comprehension intact [Cranial Nerves Optic (II)] : visual acuity intact bilaterally,  visual fields full to confrontation, pupils equal round and reactive to light [Cranial Nerves Oculomotor (III)] : extraocular motion intact [Cranial Nerves Trigeminal (V)] : facial sensation intact symmetrically [Cranial Nerves Facial (VII)] : face symmetrical [Cranial Nerves Vestibulocochlear (VIII)] : hearing was intact bilaterally [Cranial Nerves Glossopharyngeal (IX)] : tongue and palate midline [Cranial Nerves Accessory (XI - Cranial And Spinal)] : head turning and shoulder shrug symmetric [Cranial Nerves Hypoglossal (XII)] : there was no tongue deviation with protrusion [Motor Tone] : muscle tone was normal in all four extremities [Motor Strength] : muscle strength was normal in all four extremities [No Muscle Atrophy] : normal bulk in all four extremities [Paresis Pronator Drift Right-Sided] : no pronator drift on the right [Paresis Pronator Drift Left-Sided] : no pronator drift on the left [Sensation Tactile Decrease] : light touch was intact [Sensation Pain / Temperature Decrease] : pain and temperature was intact [Romberg's Sign] : Romberg's sign was negtive [Abnormal Walk] : normal gait [Balance] : balance was intact [Past-pointing] : there was no past-pointing [Tremor] : no tremor present [Coordination - Dysmetria Impaired Finger-to-Nose Bilateral] : not present [Coordination - Dysmetria Impaired Heel-to-Shin Bilateral] : not present [2+] : Ankle jerk left 2+ [Plantar Reflex Right Only] : normal on the right [Plantar Reflex Left Only] : normal on the left [PERRL With Normal Accommodation] : pupils were equal in size, round, reactive to light, with normal accommodation [Extraocular Movements] : extraocular movements were intact [Optic Disc Abnormality] : the optic disc were normal in size and color

## 2024-08-07 NOTE — ASSESSMENT
[FreeTextEntry1] : Headaches have feature of muscle contraction/tension-type headache.   He will continue nortriptyline 75 mg at bedtime. I have again cautioned him about overuse Tylenol leading to withdrawal headaches and he understands this Follow-up in 1 year and by phone prior to that if needed.

## 2024-10-31 ENCOUNTER — APPOINTMENT (OUTPATIENT)
Dept: NEUROLOGY | Facility: CLINIC | Age: 39
End: 2024-10-31

## 2024-11-05 ENCOUNTER — HOSPITAL ENCOUNTER (EMERGENCY)
Facility: CLINIC | Age: 39
Discharge: HOME | End: 2024-11-05
Attending: FAMILY MEDICINE | Admitting: FAMILY MEDICINE
Payer: COMMERCIAL

## 2024-11-05 VITALS
DIASTOLIC BLOOD PRESSURE: 78 MMHG | SYSTOLIC BLOOD PRESSURE: 114 MMHG | OXYGEN SATURATION: 96 % | HEART RATE: 97 BPM | TEMPERATURE: 98.1 F | RESPIRATION RATE: 16 BRPM

## 2024-11-05 DIAGNOSIS — J20.9 ACUTE BRONCHITIS, UNSPECIFIED ORGANISM: ICD-10-CM

## 2024-11-05 DIAGNOSIS — F11.93 OPIOID WITHDRAWAL (H): ICD-10-CM

## 2024-11-05 DIAGNOSIS — F11.93 OPIATE WITHDRAWAL (H): ICD-10-CM

## 2024-11-05 PROCEDURE — 99284 EMERGENCY DEPT VISIT MOD MDM: CPT | Performed by: FAMILY MEDICINE

## 2024-11-05 RX ORDER — GABAPENTIN 300 MG/1
300 CAPSULE ORAL EVERY 8 HOURS PRN
Qty: 30 CAPSULE | Refills: 0 | Status: SHIPPED | OUTPATIENT
Start: 2024-11-05

## 2024-11-05 RX ORDER — ALBUTEROL SULFATE 90 UG/1
2 INHALANT RESPIRATORY (INHALATION) EVERY 6 HOURS PRN
Qty: 18 G | Refills: 0 | Status: SHIPPED | OUTPATIENT
Start: 2024-11-05

## 2024-11-05 RX ORDER — AZITHROMYCIN 250 MG/1
TABLET, FILM COATED ORAL
Qty: 6 TABLET | Refills: 0 | Status: SHIPPED | OUTPATIENT
Start: 2024-11-05

## 2024-11-05 RX ORDER — BUPRENORPHINE HYDROCHLORIDE AND NALOXONE HYDROCHLORIDE DIHYDRATE 8; 2 MG/1; MG/1
1 TABLET SUBLINGUAL 2 TIMES DAILY PRN
Qty: 30 TABLET | Refills: 0 | Status: SHIPPED | OUTPATIENT
Start: 2024-11-05

## 2024-11-05 ASSESSMENT — COLUMBIA-SUICIDE SEVERITY RATING SCALE - C-SSRS
1. IN THE PAST MONTH, HAVE YOU WISHED YOU WERE DEAD OR WISHED YOU COULD GO TO SLEEP AND NOT WAKE UP?: NO
2. HAVE YOU ACTUALLY HAD ANY THOUGHTS OF KILLING YOURSELF IN THE PAST MONTH?: NO
6. HAVE YOU EVER DONE ANYTHING, STARTED TO DO ANYTHING, OR PREPARED TO DO ANYTHING TO END YOUR LIFE?: NO

## 2024-11-05 NOTE — ED TRIAGE NOTES
[place], [place] at [place]. Is out of his suboxone and gabapentin and wants a refill before he leaves

## 2024-11-05 NOTE — DISCHARGE INSTRUCTIONS
Home.  Take the zpak for bronchitis  Use the albuterol inhaler for cough  Suboxone 8-2 for withdrawal.  Gabapentin for withdrawal.  Follow up with recovery clinic when able.  Return if concerns.

## 2024-11-05 NOTE — ED PROVIDER NOTES
[facility] EMERGENCY DEPARTMENT ([facility] [place])    11/05/24      ED PROVIDER NOTE   ED 1  History     Chief Complaint   Patient presents with    Medication Refill     The history is provided by the patient and medical records.     Rosalio Arreola is a 39 year old male history of hepatitis B, opiate use disorder who presents to the emergency department seeking a refill of his Suboxone and gabapentin.  He tried to go to the recovery clinic but they are closed and he is leaving the country today, has to be at the airport at 1:30 PM today. Is out of his suboxone and gabapentin and wants a refill before he leaves.  He has been on buprenorphine-naloxone (SUBOXONE) 8-2 MG SUBL sublingual tablet twice daily in the past, last prescribed by myself on 12/31/23.    Patient notes that he had been off his Suboxone the last couple months and doing well although he recently relapsed using fentanyl a couple days ago now experiencing withdrawal symptoms.  Patient also been coughing a lot having some wheezing similar to his bronchitis and wonder if he could get this filled also he is leaving with his mother to go to [place] for this next month.  No chest pain hemoptysis fever chills otherwise with this.  No other neurochanges otherwise feels comfortable this plan.    Past Medical History  No past medical history on file.  No past surgical history on file.  albuterol (PROAIR HFA/PROVENTIL HFA/VENTOLIN HFA) 108 (90 Base) MCG/ACT inhaler  azithromycin (ZITHROMAX Z-JENARO) 250 MG tablet  buprenorphine-naloxone (SUBOXONE) 8-2 MG SUBL sublingual tablet  gabapentin (NEURONTIN) 300 MG capsule  buprenorphine HCl-naloxone HCl (SUBOXONE) 8-2 MG per film  cloNIDine (CATAPRES) 0.1 MG tablet  LORazepam (ATIVAN) 1 MG tablet  naloxone (NARCAN) 4 MG/0.1ML nasal spray  ondansetron (ZOFRAN ODT) 4 MG ODT tab  traZODone (DESYREL) 50 MG tablet      Allergies   Allergen Reactions    Acetylcysteine Itching     Rash      Family History  No family history on  file.  Social History   Social History     Tobacco Use    Smoking status: Smoker, Current Status Unknown     Current packs/day: 0.25     Types: Cigarettes   Substance Use Topics    Alcohol use: No    Drug use: No      A medically appropriate review of systems was performed with pertinent positives and negatives noted in the HPI, and all other systems negative.    Physical Exam   BP: 114/78  Pulse: 97  Temp: 98.1  F (36.7  C)  Resp: 16  SpO2: 96 %  Physical Exam  Vitals and nursing note reviewed.   Constitutional:       General: He is in acute distress.      Appearance: Normal appearance. He is not toxic-appearing.      Comments: Mild distress noted but otherwise cooperative here.  Borderline tachycardic not markedly diaphoretic but experiencing some mild withdrawal symptoms of some mild nausea generalized achiness.   HENT:      Head: Atraumatic.      Nose: Nose normal.      Mouth/Throat:      Mouth: Mucous membranes are moist.      Pharynx: Oropharynx is clear.   Eyes:      General: No scleral icterus.     Extraocular Movements: Extraocular movements intact.      Conjunctiva/sclera: Conjunctivae normal.      Pupils: Pupils are equal, round, and reactive to light.   Cardiovascular:      Rate and Rhythm: Normal rate and regular rhythm.      Heart sounds: Normal heart sounds.   Pulmonary:      Effort: Pulmonary effort is normal. No respiratory distress.      Breath sounds: Normal breath sounds. No stridor. No wheezing or rales.   Abdominal:      General: There is no distension.      Palpations: Abdomen is soft.      Tenderness: There is no abdominal tenderness. There is no guarding.   Musculoskeletal:         General: No tenderness, deformity or signs of injury.      Cervical back: Neck supple. No rigidity or tenderness.   Skin:     General: Skin is warm.      Capillary Refill: Capillary refill takes less than 2 seconds.      Coloration: Skin is not jaundiced or pale.   Neurological:      General: No focal deficit  present.      Mental Status: He is alert and oriented to person, place, and time. Mental status is at baseline.   Psychiatric:      Comments: Mild anxious otherwise appropriate here in the ER       ED Course, Procedures, & Data      Records reviewed in epic.  I did see the patient December last year treated for bronchitis along with this opiate withdrawal also.  This point Meds reviewed allergies reviewed.    Evaluated patient here in the ER at this point patient has agreed we did write a prescription I wrote him for 30 of the 8-2 Suboxone tablets along with this Z-Savage and albuterol inhaler for his bronchitis as he is traveling to [place] also gabapentin as needed for withdrawal symptoms.  Patient should follow-up with recovery clinic as soon as possible.  Patient agrees with plan will be discharged.    Procedures            No results found for any visits on 11/05/24.  Medications - No data to display  Labs Ordered and Resulted from Time of ED Arrival to Time of ED Departure - No data to display  No orders to display          Critical care was not performed.     Medical Decision Making  The patient's presentation was of moderate complexity (a chronic illness mild to moderate exacerbation, progression, or side effect of treatment).    The patient's evaluation involved:  review of external note(s) from 3+ sources (see separate area of note for details)  review of 3+ test result(s) ordered prior to this encounter (see separate area of note for details)    The patient's management necessitated moderate risk (prescription drug management including medications given in the ED).    Assessment & Plan   31-year-old male with history of opiate dependence with withdrawal in the past has used Suboxone successfully.  He has been off it for the last couple months doing well but has relapsed down his fentanyl.  Last use with a couple days ago some mild withdrawal symptoms patient presents now for evaluation seeking prescription  refill of the Suboxone along with gabapentin also has been coughing with some bronchitis symptoms and describes some bronchospasm although no audible wheezing or no indication for chest x-ray.  We treated him with success last December with a Z-Savage along with albuterol was requesting this also.  Patient is flying to [place] with his mother today and needs these filled is good to be gone for the next month.  In the ER patient otherwise vitally stable some mild withdrawal symptoms but nontoxic no major respiratory distress no indication for any other interventions x-rays etc. breath sounds were equal at this point we will treat him though for underlying recurrent bronchitis with the medications prescribed as noted and follow-up with recovery clinic as soon as possible patient agrees with plan, discharge.       I have reviewed the nursing notes. I have reviewed the findings, diagnosis, plan and need for follow up with the patient.    Current Discharge Medication List          Final diagnoses:   Acute bronchitis, unspecified organism   Opiate withdrawal (H)     I, Betsy Padron, am serving as a trained medical scribe to document services personally performed by Julio Zazueta MD based on the provider's statements to me on November 5, 2024.  This document has been checked and approved by the attending provider.    I, Julio Zazueta MD, was physically present and have reviewed and verified the accuracy of this note documented by Betsy Padron, medical scribe.      Julio Zazueta MD   [facility] EMERGENCY DEPARTMENT  11/5/2024    This note was created at least in part by the use of dragon voice dictation system. Inadvertent typographical errors may still exist.  Julio Zazueta MD.  Patient evaluated in the emergency department during the COVID-19 pandemic period. Careful attention to patients safety was addressed throughout the evaluation. Evaluation and treatment management was initiated with  disposition made efficiently and appropriate as possible to minimize any risk of potential exposure to patient during this evaluation.          Julio Zazueta MD  11/05/24 [id number]

## 2025-05-09 ENCOUNTER — RX RENEWAL (OUTPATIENT)
Age: 40
End: 2025-05-09

## 2025-06-27 ENCOUNTER — OFFICE (OUTPATIENT)
Dept: URBAN - METROPOLITAN AREA CLINIC 12 | Facility: CLINIC | Age: 40
Setting detail: OPHTHALMOLOGY
End: 2025-06-27
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
## 2025-06-27 PROCEDURE — NO SHOW FE NO SHOW FEE: Performed by: OPHTHALMOLOGY

## 2025-07-19 ENCOUNTER — RX RENEWAL (OUTPATIENT)
Age: 40
End: 2025-07-19

## 2025-07-24 ENCOUNTER — HOSPITAL ENCOUNTER (EMERGENCY)
Facility: CLINIC | Age: 40
Discharge: LEFT BEFORE DISCHARGE | End: 2025-07-24
Payer: COMMERCIAL

## 2025-07-24 VITALS
HEART RATE: 96 BPM | DIASTOLIC BLOOD PRESSURE: 76 MMHG | SYSTOLIC BLOOD PRESSURE: 116 MMHG | OXYGEN SATURATION: 98 % | RESPIRATION RATE: 13 BRPM | TEMPERATURE: 97.8 F

## 2025-07-24 PROCEDURE — 99281 EMR DPT VST MAYX REQ PHY/QHP: CPT

## 2025-07-24 ASSESSMENT — COLUMBIA-SUICIDE SEVERITY RATING SCALE - C-SSRS
1. IN THE PAST MONTH, HAVE YOU WISHED YOU WERE DEAD OR WISHED YOU COULD GO TO SLEEP AND NOT WAKE UP?: NO
6. HAVE YOU EVER DONE ANYTHING, STARTED TO DO ANYTHING, OR PREPARED TO DO ANYTHING TO END YOUR LIFE?: NO
2. HAVE YOU ACTUALLY HAD ANY THOUGHTS OF KILLING YOURSELF IN THE PAST MONTH?: NO

## 2025-07-24 NOTE — ED TRIAGE NOTES
Meth and fentanyl, last use was today at 2 hrs, not sure amount, looking for detox, no alcohol use     Triage Assessment (Adult)       Row Name 07/24/25 0667          Triage Assessment    Airway WDL WDL        Respiratory WDL    Respiratory WDL X  low respiration        Skin Circulation/Temperature WDL    Skin Circulation/Temperature WDL WDL        Cardiac WDL    Cardiac WDL WDL        Peripheral/Neurovascular WDL    Peripheral Neurovascular WDL WDL        Cognitive/Neuro/Behavioral WDL    Cognitive/Neuro/Behavioral WDL WDL

## 2025-07-24 NOTE — ED PROVIDER NOTES
"    [facility] [place] ([facility])    7/24/25      ED PROVIDER NOTE      History     Chief Complaint   Patient presents with    Addiction Problem     Meth and fentanyl, last use was today at 2 hrs, not sure amount, looking for detox, no alcohol use     HPI  Said TEVIN Arreola is a 40 year old male with a history of hepatitis B, opiate use disorder who presents to the ED seeking detox after taking methamphetamine and fentanyl.    No past medical history on file.    Physical Exam   BP: 116/76  Pulse: 96  Temp: 97.8  F (36.6  C)  Resp: 13  SpO2: 98 %  Physical Exam      ED Course, Procedures, & Data      Procedures       {ED Course Selections (Optional):596923}          Medications - No data to display       {Critical Care Performed?:845132}    Assessment & Plan    Said TEVIN Arreola is a 40 year old male with a history of hepatitis B, opiate use disorder who presents to the ED seeking detox after taking methamphetamine and fentanyl. Vitals in the ED ***. Physical exam ***. Initial differential diagnosis includes but not limited to ***. Nursing notes reviewed.    {labs}{radiology}    In the ED, the patient's symptoms were managed with ***, {w-w/o:515317} improvement in symptoms upon reassessment. {Other interventions}    The complete clinical picture is most consistent with ***. After counseling on the diagnosis, work-up, and treatment plan, the patient was { dispo choices:821961::\"discharged\"} to ***. The patient was advised to follow-up with *** in ***. The patient was advised to return to the ED if worsening symptoms, new concerning symptoms, or any urgent health concerns. Patient seen and discussed with attending physician  *** and we are in agreement with the plan of care.      Final diagnoses:   None     New Prescriptions    No medications on file     --  Alejandro Yancey PA-C   Emergency Medicine   [facility] EMERGENCY DEPARTMENT  7/24/2025  "

## 2025-07-28 ENCOUNTER — HOSPITAL ENCOUNTER (EMERGENCY)
Facility: CLINIC | Age: 40
Discharge: PSYCHIATRIC OR SUBSTANCE USE FACILITY | End: 2025-07-28
Attending: FAMILY MEDICINE
Payer: COMMERCIAL

## 2025-07-28 VITALS
OXYGEN SATURATION: 99 % | RESPIRATION RATE: 16 BRPM | HEART RATE: 73 BPM | TEMPERATURE: 98.4 F | SYSTOLIC BLOOD PRESSURE: 130 MMHG | BODY MASS INDEX: 24.2 KG/M2 | HEIGHT: 73 IN | WEIGHT: 182.6 LBS | DIASTOLIC BLOOD PRESSURE: 88 MMHG

## 2025-07-28 DIAGNOSIS — F11.229 OPIOID DEPENDENCE WITH INTOXICATION WITH COMPLICATION (H): Primary | ICD-10-CM

## 2025-07-28 PROCEDURE — 99284 EMERGENCY DEPT VISIT MOD MDM: CPT | Performed by: FAMILY MEDICINE

## 2025-07-28 PROCEDURE — 99282 EMERGENCY DEPT VISIT SF MDM: CPT | Performed by: FAMILY MEDICINE

## 2025-07-28 ASSESSMENT — COLUMBIA-SUICIDE SEVERITY RATING SCALE - C-SSRS
6. HAVE YOU EVER DONE ANYTHING, STARTED TO DO ANYTHING, OR PREPARED TO DO ANYTHING TO END YOUR LIFE?: NO
1. IN THE PAST MONTH, HAVE YOU WISHED YOU WERE DEAD OR WISHED YOU COULD GO TO SLEEP AND NOT WAKE UP?: NO
2. HAVE YOU ACTUALLY HAD ANY THOUGHTS OF KILLING YOURSELF IN THE PAST MONTH?: NO

## 2025-07-28 ASSESSMENT — ACTIVITIES OF DAILY LIVING (ADL)
ADLS_ACUITY_SCORE: 41

## 2025-07-29 NOTE — ED TRIAGE NOTES
Patient looking for detox from fentanyl and meth, last use was today at noon for both. No other drug or alcohol use as reported by the patient. States he is feeling some withdrawal symptoms currently, stomach and back hurts     Triage Assessment (Adult)       Row Name 07/28/25 2005          Triage Assessment    Airway WDL WDL        Respiratory WDL    Respiratory WDL WDL        Skin Circulation/Temperature WDL    Skin Circulation/Temperature WDL WDL        Cardiac WDL    Cardiac WDL WDL        Peripheral/Neurovascular WDL    Peripheral Neurovascular WDL WDL        Cognitive/Neuro/Behavioral WDL    Cognitive/Neuro/Behavioral WDL X;all     Level of Consciousness alert     Arousal Level opens eyes spontaneously     Orientation oriented x 4     Speech spontaneous;logical;slurred     Mood/Behavior flat affect;hypoactive (quiet, withdrawn);cooperative;withdrawn        Pupils (CN II)    Pupil Size Left 5 mm     Pupil Size Right 5 mm        [place] Coma Scale    Best Eye Response 4-->(E4) spontaneous     Best Motor Response 6-->(M6) obeys commands     Best Verbal Response 5-->(V5) oriented     [place] Coma Scale Score 15

## 2025-07-29 NOTE — ED NOTES
Patient accepted at [facility] in [place].  Address: [address] [place], [place] [id number]  Tel: [phone]  Nurse report given to Benson powell by Jesse.

## 2025-07-29 NOTE — ED PROVIDER NOTES
[facility] EMERGENCY DEPARTMENT ([facility])    7/28/25      ED PROVIDER NOTE       History     Chief Complaint   Patient presents with    Addiction Problem     Looking for detox from fentanyl and meth, last use of both was today at noon.      ELDA Santamaria TEVIN Arreola is a 40 year old male with a history of hepatitis B, polysubstance use disorder, and alcohol use disorder who presents to the emergency department seeking detox from fentanyl and meth.    Patient reports using fentanyl and meth today, last used both around noon. Denies other drug or alcohol use. Patient reports withdrawal symptoms currently, endorses abdominal pain and back pain.    Patient does not qualify for inpatient detox here at [facility] however I discussed with him the possibility of going to [facility] in [facility] and he agreed that this would be a good option for him he screening phone call was set up and patient was accepted to [facility]        Past Medical History  History reviewed. No pertinent past medical history.  History reviewed. No pertinent surgical history.  albuterol (PROAIR HFA/PROVENTIL HFA/VENTOLIN HFA) 108 (90 Base) MCG/ACT inhaler  azithromycin (ZITHROMAX Z-JENARO) 250 MG tablet  buprenorphine HCl-naloxone HCl (SUBOXONE) 8-2 MG per film  buprenorphine-naloxone (SUBOXONE) 8-2 MG SUBL sublingual tablet  cloNIDine (CATAPRES) 0.1 MG tablet  gabapentin (NEURONTIN) 300 MG capsule  LORazepam (ATIVAN) 1 MG tablet  naloxone (NARCAN) 4 MG/0.1ML nasal spray  ondansetron (ZOFRAN ODT) 4 MG ODT tab  traZODone (DESYREL) 50 MG tablet      Allergies   Allergen Reactions    Acetylcysteine Itching     Rash      Family History  History reviewed. No pertinent family history.  Social History   Social History     Tobacco Use    Smoking status: Smoker, Current Status Unknown     Current packs/day: 0.25     Types: Cigarettes   Substance Use Topics    Alcohol use: No    Drug use: Yes     Types: Methamphetamines, Fentanyl      A medically  "appropriate review of systems was performed with pertinent positives and negatives noted in the HPI, and all other systems negative.    Physical Exam   BP: 117/78  Pulse: 72  Temp: 98  F (36.7  C)  Resp: 14  Height: 185.4 cm (6' 1\")  Weight: 82.8 kg (182 lb 9.6 oz)  SpO2: 100 %  Physical Exam  Constitutional:       General: He is not in acute distress.     Appearance: Normal appearance. He is not toxic-appearing.   HENT:      Head: Atraumatic.   Eyes:      General: No scleral icterus.     Conjunctiva/sclera: Conjunctivae normal.   Cardiovascular:      Rate and Rhythm: Normal rate.      Heart sounds: Normal heart sounds.   Pulmonary:      Effort: Pulmonary effort is normal. No respiratory distress.      Breath sounds: Normal breath sounds.   Abdominal:      Palpations: Abdomen is soft.      Tenderness: There is no abdominal tenderness.   Musculoskeletal:         General: No deformity.      Cervical back: Neck supple.   Skin:     General: Skin is warm.   Neurological:      General: No focal deficit present.      Mental Status: He is alert and oriented to person, place, and time.      Sensory: No sensory deficit.      Motor: No weakness.      Coordination: Coordination normal.   Psychiatric:         Mood and Affect: Mood is anxious.         Thought Content: Thought content does not include homicidal or suicidal ideation.           ED Course, Procedures, & Data      Procedures        Medications - No data to display       Critical care was not performed.       Medical Decision Making  The patient's presentation was of moderate complexity (a chronic illness mild to moderate exacerbation, progression, or side effect of treatment).    The patient's evaluation involved:  discussion of management or test interpretation with another health professional (I discussed the possibility of treatment with on-call nurse at [facility] with acceptance of the patient to their program discussed the need for hospitalization versus " outpatient detox management and agreed that he would be appropriate further detox)    The patient's management necessitated high risk (a decision regarding hospitalization).   Assessment & Plan        I have reviewed the nursing notes. I have reviewed the findings, diagnosis, plan and need for follow up with the patient.        Final diagnoses:   Opioid dependence with intoxication with complication (H)       Rush Scott MD  [facility] EMERGENCY DEPARTMENT  7/28/2025     Rush Scott MD  07/31/25 4241

## 2025-07-29 NOTE — DISCHARGE INSTRUCTIONS
Discharge from the emergency room with plans to go to [place] detox for further stabilization and treatment

## 2025-08-07 ENCOUNTER — APPOINTMENT (OUTPATIENT)
Dept: NEUROLOGY | Facility: CLINIC | Age: 40
End: 2025-08-07